# Patient Record
Sex: FEMALE | Race: WHITE | URBAN - NONMETROPOLITAN AREA
[De-identification: names, ages, dates, MRNs, and addresses within clinical notes are randomized per-mention and may not be internally consistent; named-entity substitution may affect disease eponyms.]

---

## 2019-10-14 ENCOUNTER — OFFICE VISIT (OUTPATIENT)
Dept: URGENT CARE | Facility: PHYSICIAN GROUP | Age: 19
End: 2019-10-14
Payer: COMMERCIAL

## 2019-10-14 VITALS
HEART RATE: 80 BPM | OXYGEN SATURATION: 98 % | SYSTOLIC BLOOD PRESSURE: 114 MMHG | DIASTOLIC BLOOD PRESSURE: 72 MMHG | WEIGHT: 146 LBS | BODY MASS INDEX: 25.87 KG/M2 | RESPIRATION RATE: 14 BRPM | HEIGHT: 63 IN | TEMPERATURE: 97.5 F

## 2019-10-14 DIAGNOSIS — H66.002 NON-RECURRENT ACUTE SUPPURATIVE OTITIS MEDIA OF LEFT EAR WITHOUT SPONTANEOUS RUPTURE OF TYMPANIC MEMBRANE: ICD-10-CM

## 2019-10-14 PROCEDURE — 99203 OFFICE O/P NEW LOW 30 MIN: CPT | Performed by: FAMILY MEDICINE

## 2019-10-14 RX ORDER — CEFDINIR 300 MG/1
300 CAPSULE ORAL 2 TIMES DAILY
Qty: 14 CAP | Refills: 0 | Status: SHIPPED | OUTPATIENT
Start: 2019-10-14 | End: 2019-10-21

## 2019-10-14 ASSESSMENT — ENCOUNTER SYMPTOMS
FEVER: 0
SHORTNESS OF BREATH: 0
VOMITING: 0

## 2019-10-15 NOTE — PROGRESS NOTES
"Subjective:     Gianna Cruz is a 18 y.o. female who presents for Otalgia    HPI  Pt presents for evaluation of a new problem   Pt with left ear pain for the past few weeks   Initially was only intermittent  Pain is now constant and worsening the last week  Nothing makes pain better    Review of Systems   Constitutional: Negative for fever.   HENT: Positive for ear pain.    Respiratory: Negative for shortness of breath.    Cardiovascular: Negative for chest pain.   Gastrointestinal: Negative for vomiting.   Skin: Negative for rash.       PMH: No recurrent ear problems  MEDS:   Current Outpatient Medications:   •  fluoxetine (PROZAC) 20 MG Cap, Take 1 Cap by mouth every day., Disp: 30 Cap, Rfl: 2  ALLERGIES:   Allergies   Allergen Reactions   • Pcn [Penicillins]      SURGHX: No past surgical history on file.  SOCHX:  reports that she has never smoked. She has never used smokeless tobacco. She reports that she does not drink alcohol or use drugs.  FH: Family history was reviewed, not contributing to acute illness     Objective:   /72 (BP Location: Left arm, Patient Position: Sitting, BP Cuff Size: Adult)   Pulse 80   Temp 36.4 °C (97.5 °F) (Temporal)   Resp 14   Ht 1.6 m (5' 3\")   Wt 66.2 kg (146 lb)   SpO2 98%   BMI 25.86 kg/m²     Physical Exam   Constitutional: She is oriented to person, place, and time. She appears well-developed and well-nourished. No distress.   HENT:   Head: Normocephalic and atraumatic.   Left ear erythematous with small purulent effusion present, no perforation appreciated   Neurological: She is alert and oriented to person, place, and time. No sensory deficit.   Skin: Skin is warm and dry. She is not diaphoretic. No erythema.   Psychiatric: She has a normal mood and affect. Her behavior is normal. Judgment and thought content normal.       Assessment/Plan:   Assessment    1. Non-recurrent acute suppurative otitis media of left ear without spontaneous rupture of tympanic " membrane  - cefdinir (OMNICEF) 300 MG Cap; Take 1 Cap by mouth 2 times a day for 7 days.  Dispense: 14 Cap; Refill: 0

## 2020-01-09 ENCOUNTER — GYNECOLOGY VISIT (OUTPATIENT)
Dept: OBGYN | Facility: CLINIC | Age: 20
End: 2020-01-09
Payer: COMMERCIAL

## 2020-01-09 VITALS — SYSTOLIC BLOOD PRESSURE: 127 MMHG | WEIGHT: 147 LBS | BODY MASS INDEX: 26.04 KG/M2 | DIASTOLIC BLOOD PRESSURE: 80 MMHG

## 2020-01-09 DIAGNOSIS — N93.8 DUB (DYSFUNCTIONAL UTERINE BLEEDING): ICD-10-CM

## 2020-01-09 LAB
INT CON NEG: NEGATIVE
INT CON POS: POSITIVE
POC URINE PREGNANCY TEST: POSITIVE

## 2020-01-09 PROCEDURE — 99203 OFFICE O/P NEW LOW 30 MIN: CPT | Mod: 25 | Performed by: OBSTETRICS & GYNECOLOGY

## 2020-01-09 PROCEDURE — 76830 TRANSVAGINAL US NON-OB: CPT | Performed by: OBSTETRICS & GYNECOLOGY

## 2020-01-09 PROCEDURE — 81025 URINE PREGNANCY TEST: CPT | Performed by: OBSTETRICS & GYNECOLOGY

## 2020-01-09 NOTE — NON-PROVIDER
Pt here for her DUB  States morning sickness, breast pain, diarrhea and cramps   LMP: Not sure  Ph#[phone]  Pharmacy confirmed w/ pt

## 2020-01-09 NOTE — PROGRESS NOTES
Chief complaint;  This patient is a 19 y.o. female , No LMP recorded. Patient is pregnant. presents complaining of dysfunctional uterine bleeding.    Review of systems-denies; chest pain, shortness of breath, fever, chills, abdominal pain  Past OB history-  OB History    Para Term  AB Living   1             SAB TAB Ectopic Molar Multiple Live Births                    # Outcome Date GA Lbr Geovanny/2nd Weight Sex Delivery Anes PTL Lv   1 Current              Past surgical history- History reviewed. No pertinent surgical history.  Past medical history-   Past Medical History:   Diagnosis Date   • Depression      Allergies- Pcn [penicillins]  Present medications-   Outpatient Encounter Medications as of 2020   Medication Sig Dispense Refill   • fluoxetine (PROZAC) 20 MG Cap Take 1 Cap by mouth every day. 30 Cap 2     No facility-administered encounter medications on file as of 2020.      Family history- no history of breast or ovarian cancer  Social history-   Social History     Socioeconomic History   • Marital status: Single     Spouse name: Not on file   • Number of children: Not on file   • Years of education: Not on file   • Highest education level: Not on file   Occupational History   • Not on file   Social Needs   • Financial resource strain: Not on file   • Food insecurity:     Worry: Not on file     Inability: Not on file   • Transportation needs:     Medical: Not on file     Non-medical: Not on file   Tobacco Use   • Smoking status: Never Smoker   • Smokeless tobacco: Never Used   Substance and Sexual Activity   • Alcohol use: No   • Drug use: No   • Sexual activity: Yes     Partners: Male   Lifestyle   • Physical activity:     Days per week: Not on file     Minutes per session: Not on file   • Stress: Not on file   Relationships   • Social connections:     Talks on phone: Not on file     Gets together: Not on file     Attends Sikhism service: Not on file     Active member of club or  organization: Not on file     Attends meetings of clubs or organizations: Not on file     Relationship status: Not on file   • Intimate partner violence:     Fear of current or ex partner: Not on file     Emotionally abused: Not on file     Physically abused: Not on file     Forced sexual activity: Not on file   Other Topics Concern   • Behavioral problems Not Asked   • Interpersonal relationships Not Asked   • Sad or not enjoying activities Not Asked   • Suicidal thoughts Not Asked   • Poor school performance Not Asked   • Reading difficulties Not Asked   • Speech difficulties Not Asked   • Writing difficulties Not Asked   • Inadequate sleep Not Asked   • Excessive TV viewing Not Asked   • Excessive video game use Not Asked   • Inadequate exercise Not Asked   • Sports related Not Asked   • Poor diet Not Asked   • Family concerns for drug/alcohol abuse Not Asked   • Poor oral hygiene Not Asked   • Bike safety Not Asked   • Family concerns vehicle safety Not Asked   Social History Narrative   • Not on file         Physical examination;   Alert and oriented x3  General-well-developed well-nourished female in no apparent distress  Vitals:    01/09/20 1358   BP: 127/80   Weight: 66.7 kg (147 lb)     Skin is warm and dry   HEENT-normocephalic,nontraumatic,PERRLA,EOMI  Throat- no edema or erythema  Neck-supple  Cardiovascular-regular rate and rhythm, normal S1-S2 without murmurs or gallops  Lungs-clear to auscultation bilaterally  Back-negative CVA tenderness  Abdomen-nondistended positive bowel sounds soft nontender without masses or hepatosplenomegaly  Pelvic examination;  External genitalia-without lesions  Vagina-no blood, no discharge  Cervix-closed,no gross lesions  Uterus-8 weeks size, nontender  Adnexa- without mass or tenderness  Extremities-without cyanosis clubbing or edema  Neurologic-grossly intact    Transvaginal ultrasound; as performed and read by myself; intrauterine gestational sac containing vinson  pregnancy crown-rump length consistent with 7 weeks 5 days, EDC 8/22/2020+ cardiac and fetal motion yolk sac present no free pelvic fluid    Impression;  Dysfunctional uterine bleeding-no evidence of ectopic or miscarriage    Plan;   SS-not discussed  Needs initial OB      35 Minutes total spent with the patient in face-to-face contact,5 minutes of total time utilized to perform  Ultrasound, greater than 50% of the time has been spent on counseling and coordination of care. Many questions answered regarding possible miscarriage.

## 2020-04-04 ENCOUNTER — HOSPITAL ENCOUNTER (OUTPATIENT)
Facility: MEDICAL CENTER | Age: 20
End: 2020-04-04
Attending: PHYSICIAN ASSISTANT
Payer: COMMERCIAL

## 2020-04-04 ENCOUNTER — OFFICE VISIT (OUTPATIENT)
Dept: URGENT CARE | Facility: PHYSICIAN GROUP | Age: 20
End: 2020-04-04
Payer: COMMERCIAL

## 2020-04-04 VITALS
OXYGEN SATURATION: 97 % | WEIGHT: 147 LBS | BODY MASS INDEX: 26.05 KG/M2 | TEMPERATURE: 98.3 F | SYSTOLIC BLOOD PRESSURE: 110 MMHG | DIASTOLIC BLOOD PRESSURE: 70 MMHG | HEIGHT: 63 IN | RESPIRATION RATE: 16 BRPM | HEART RATE: 79 BPM

## 2020-04-04 DIAGNOSIS — R42 DIZZY: ICD-10-CM

## 2020-04-04 DIAGNOSIS — R10.30 LOWER ABDOMINAL PAIN: ICD-10-CM

## 2020-04-04 DIAGNOSIS — N30.01 ACUTE CYSTITIS WITH HEMATURIA: ICD-10-CM

## 2020-04-04 DIAGNOSIS — J02.9 SORE THROAT: ICD-10-CM

## 2020-04-04 DIAGNOSIS — M79.10 MYALGIA: ICD-10-CM

## 2020-04-04 DIAGNOSIS — R09.81 NASAL CONGESTION: ICD-10-CM

## 2020-04-04 LAB
APPEARANCE UR: CLEAR
BILIRUB UR STRIP-MCNC: NEGATIVE MG/DL
COLOR UR AUTO: YELLOW
FLUAV+FLUBV AG SPEC QL IA: NEGATIVE
GLUCOSE UR STRIP.AUTO-MCNC: NEGATIVE MG/DL
INT CON NEG: NORMAL
INT CON POS: NORMAL
KETONES UR STRIP.AUTO-MCNC: NEGATIVE MG/DL
LEUKOCYTE ESTERASE UR QL STRIP.AUTO: NORMAL
NITRITE UR QL STRIP.AUTO: POSITIVE
PH UR STRIP.AUTO: 7 [PH] (ref 5–8)
POC URINE PREGNANCY TEST: NEGATIVE
PROT UR QL STRIP: NEGATIVE MG/DL
RBC UR QL AUTO: NORMAL
S PYO AG THROAT QL: NEGATIVE
SP GR UR STRIP.AUTO: 1.02
UROBILINOGEN UR STRIP-MCNC: NEGATIVE MG/DL

## 2020-04-04 PROCEDURE — 99214 OFFICE O/P EST MOD 30 MIN: CPT | Performed by: PHYSICIAN ASSISTANT

## 2020-04-04 PROCEDURE — 87086 URINE CULTURE/COLONY COUNT: CPT

## 2020-04-04 PROCEDURE — 87804 INFLUENZA ASSAY W/OPTIC: CPT | Performed by: PHYSICIAN ASSISTANT

## 2020-04-04 PROCEDURE — 81025 URINE PREGNANCY TEST: CPT | Performed by: PHYSICIAN ASSISTANT

## 2020-04-04 PROCEDURE — 87077 CULTURE AEROBIC IDENTIFY: CPT

## 2020-04-04 PROCEDURE — 87880 STREP A ASSAY W/OPTIC: CPT | Performed by: PHYSICIAN ASSISTANT

## 2020-04-04 PROCEDURE — 81002 URINALYSIS NONAUTO W/O SCOPE: CPT | Performed by: PHYSICIAN ASSISTANT

## 2020-04-04 PROCEDURE — 87186 SC STD MICRODIL/AGAR DIL: CPT

## 2020-04-04 RX ORDER — DROSPIRENONE AND ETHINYL ESTRADIOL 0.02-3(28)
1 KIT ORAL
COMMUNITY
Start: 2020-01-28 | End: 2020-08-29

## 2020-04-04 RX ORDER — NITROFURANTOIN 25; 75 MG/1; MG/1
100 CAPSULE ORAL EVERY 12 HOURS
Qty: 10 CAP | Refills: 0 | Status: SHIPPED | OUTPATIENT
Start: 2020-04-04 | End: 2020-04-09

## 2020-04-04 NOTE — PROGRESS NOTES
Chief Complaint   Patient presents with   • Dizziness       HISTORY OF PRESENT ILLNESS: Patient is a 19 y.o. female who presents today because she has a 4-day history of sore throat, nasal congestion, some episodes of dizziness, some lower abdominal discomfort and myalgias.  She has been taking DayQuil, NyQuil, Tylenol and ibuprofen for symptoms without improvement.    Patient Active Problem List    Diagnosis Date Noted   • Depression 11/24/2015   • Family history of hypothyroidism 11/24/2015       Allergies:Pcn [penicillins]    Current Outpatient Medications Ordered in Epic   Medication Sig Dispense Refill   • nitrofurantoin (MACROBID) 100 MG Cap Take 1 Cap by mouth every 12 hours for 5 days. 10 Cap 0   • LO-ZUMANDIMINE 3-0.02 MG per tablet Take 1 Tab by mouth.     • fluoxetine (PROZAC) 20 MG Cap Take 1 Cap by mouth every day. 30 Cap 2     No current Epic-ordered facility-administered medications on file.        Past Medical History:   Diagnosis Date   • Depression        Social History     Tobacco Use   • Smoking status: Never Smoker   • Smokeless tobacco: Never Used   Substance Use Topics   • Alcohol use: No   • Drug use: No       Family Status   Relation Name Status   • Mo  Alive   • Fa  Alive     Family History   Problem Relation Age of Onset   • No Known Problems Mother    • No Known Problems Father        ROS:  Review of Systems   Constitutional: Positive for fever, chills, myalgias and malaise/fatigue.   HENT: Negative for ear pain, nosebleeds, positive for nasal congestion, sore throat and no neck pain.    Eyes: Negative for blurred vision.   Respiratory: Positive for mild cough, no sputum production, shortness of breath and wheezing.    Cardiovascular: Negative for chest pain, palpitations, orthopnea and leg swelling.   Gastrointestinal: Negative for heartburn, nausea, vomiting and positive for mild lower abdominal pain.   Genitourinary: Negative for dysuria, urgency and frequency.     Exam:  /70    "Pulse 79   Temp 36.8 °C (98.3 °F) (Temporal)   Resp 16   Ht 1.6 m (5' 3\")   Wt 66.7 kg (147 lb)   SpO2 97%   General:  Well nourished, well developed female in NAD  Head:Normocephalic, atraumatic  Eyes: PERRLA, EOM within normal limits, no conjunctival injection, no scleral icterus, visual fields and acuity grossly intact.  Ears: Normal shape and symmetry, no tenderness, no discharge. External canals are without any significant edema or erythema. Tympanic membranes are without any inflammation, no effusion. Gross auditory acuity is intact  Nose: Symmetrical without tenderness, no discharge.  Nasal mucosa is edematous bilaterally  Mouth: reasonable hygiene, mild pharyngeal erythema without exudates or tonsillar enlargement.  Neck: no masses, range of motion within normal limits, no tracheal deviation. No obvious thyroid enlargement.  Pulmonary: chest is symmetrical with respiration, no wheezes, crackles, or rhonchi.  Cardiovascular: regular rate and rhythm without murmurs, rubs, or gallops.  Extremities: no clubbing, cyanosis, or edema.    Urinalysis has nitrates, moderate blood and trace leuks    Strep test negative    Influenza AB test negative    hcg negative    Please note that this dictation was created using voice recognition software. I have made every reasonable attempt to correct obvious errors, but I expect that there are errors of grammar and possibly content that I did not discover before finalizing the note.    Assessment/Plan:  1. Dizzy     2. Lower abdominal pain  POCT Urinalysis    POCT Pregnancy   3. Myalgia  POCT Influenza A/B   4. Nasal congestion     5. Sore throat  POCT Rapid Strep A   6. Acute cystitis with hematuria  Urine Culture    nitrofurantoin (MACROBID) 100 MG Cap   In addition to UTI, likely upper respiratory viral symptoms, recommended self-isolation, may return work when self-isolation criteria are met    Followup with primary care in the next 7-10 days if not significantly " improving, return to the urgent care or go to the emergency room sooner for any worsening of symptoms.

## 2020-04-04 NOTE — LETTER
April 4, 2020         Patient: Gianna Cruz   YOB: 2000   Date of Visit: 4/4/2020           To Whom it May Concern:    Gianna Cruz was seen in my clinic on 4/4/2020. She may return to work when guideline criteria of self-isolation are met in accordance with CDC guidelines    If you have any questions or concerns, please don't hesitate to call.        Sincerely,           Ryley Wagner P.A.-C.  Electronically Signed

## 2020-04-06 LAB
BACTERIA UR CULT: ABNORMAL
BACTERIA UR CULT: ABNORMAL
SIGNIFICANT IND 70042: ABNORMAL
SITE SITE: ABNORMAL
SOURCE SOURCE: ABNORMAL

## 2020-08-27 ENCOUNTER — OFFICE VISIT (OUTPATIENT)
Dept: URGENT CARE | Facility: PHYSICIAN GROUP | Age: 20
End: 2020-08-27
Payer: COMMERCIAL

## 2020-08-27 VITALS
HEIGHT: 63 IN | TEMPERATURE: 97.3 F | WEIGHT: 140 LBS | OXYGEN SATURATION: 98 % | DIASTOLIC BLOOD PRESSURE: 74 MMHG | BODY MASS INDEX: 24.8 KG/M2 | HEART RATE: 82 BPM | SYSTOLIC BLOOD PRESSURE: 116 MMHG

## 2020-08-27 DIAGNOSIS — K29.70 GASTRITIS WITHOUT BLEEDING, UNSPECIFIED CHRONICITY, UNSPECIFIED GASTRITIS TYPE: ICD-10-CM

## 2020-08-27 DIAGNOSIS — R42 DIZZY: ICD-10-CM

## 2020-08-27 DIAGNOSIS — R11.0 NAUSEA: ICD-10-CM

## 2020-08-27 LAB
APPEARANCE UR: CLEAR
BILIRUB UR STRIP-MCNC: NORMAL MG/DL
COLOR UR AUTO: YELLOW
GLUCOSE UR STRIP.AUTO-MCNC: NORMAL MG/DL
INT CON NEG: NEGATIVE
INT CON POS: POSITIVE
KETONES UR STRIP.AUTO-MCNC: NORMAL MG/DL
LEUKOCYTE ESTERASE UR QL STRIP.AUTO: NORMAL
NITRITE UR QL STRIP.AUTO: NORMAL
PH UR STRIP.AUTO: 7 [PH] (ref 5–8)
POC URINE PREGNANCY TEST: NORMAL
PROT UR QL STRIP: NORMAL MG/DL
RBC UR QL AUTO: NORMAL
SP GR UR STRIP.AUTO: 1.01
UROBILINOGEN UR STRIP-MCNC: NORMAL MG/DL

## 2020-08-27 PROCEDURE — 99214 OFFICE O/P EST MOD 30 MIN: CPT | Performed by: PHYSICIAN ASSISTANT

## 2020-08-27 PROCEDURE — 81002 URINALYSIS NONAUTO W/O SCOPE: CPT | Performed by: PHYSICIAN ASSISTANT

## 2020-08-27 PROCEDURE — 81025 URINE PREGNANCY TEST: CPT | Performed by: PHYSICIAN ASSISTANT

## 2020-08-27 RX ORDER — ELETRIPTAN HYDROBROMIDE 20 MG/1
TABLET, FILM COATED ORAL
COMMUNITY
Start: 2020-07-14 | End: 2020-08-29

## 2020-08-27 RX ORDER — MEDROXYPROGESTERONE ACETATE 150 MG/ML
150 INJECTION, SUSPENSION INTRAMUSCULAR ONCE
COMMUNITY
End: 2021-10-25

## 2020-08-27 RX ORDER — MECLIZINE HYDROCHLORIDE 25 MG/1
25 TABLET ORAL 3 TIMES DAILY PRN
Qty: 30 TAB | Refills: 0 | Status: SHIPPED | OUTPATIENT
Start: 2020-08-27 | End: 2020-08-29

## 2020-08-27 RX ORDER — DOXYCYCLINE HYCLATE 20 MG
20 TABLET ORAL
COMMUNITY
Start: 2020-06-15 | End: 2020-09-14

## 2020-08-27 RX ORDER — OMEPRAZOLE 20 MG/1
20 CAPSULE, DELAYED RELEASE ORAL DAILY
Qty: 30 CAP | Refills: 0 | Status: SHIPPED | OUTPATIENT
Start: 2020-08-27 | End: 2020-08-29

## 2020-08-27 NOTE — PROGRESS NOTES
Chief Complaint   Patient presents with   • Dizziness     x 2 days abd pain x1 month nausea       HISTORY OF PRESENT ILLNESS: Patient is a 19 y.o. female who presents today because she has 2 different issues to discuss.    1.  She has a 2-day history of dizziness.  It is worse with positional changes but continues throughout the day, sometimes causing her to sit down.  Denies any fevers, chills, ear pain.    2.  She has a 1 month history of midepigastric and sometimes lower abdominal discomfort.  No changes in bowel or bladder patterns, no vomiting.  She has not been taking any medications for either of these symptoms    Patient Active Problem List    Diagnosis Date Noted   • Depression 11/24/2015   • Family history of hypothyroidism 11/24/2015       Allergies:Pcn [penicillins]    Current Outpatient Medications Ordered in Epic   Medication Sig Dispense Refill   • doxycycline (PERIOSTAT) 20 MG tablet Take 20 mg by mouth.     • medroxyPROGESTERone (DEPO-PROVERA) 150 MG/ML Suspension 150 mg by Intramuscular route Once.     • omeprazole (PRILOSEC) 20 MG delayed-release capsule Take 1 Cap by mouth every day. 30 Cap 0   • meclizine (ANTIVERT) 25 MG Tab Take 1 Tab by mouth 3 times a day as needed. 30 Tab 0   • eletriptan (RELPAX) 20 MG Tab TAKE ONE TABLET BY MOUTH ONCE AS NEEDED FOR MIGRAINE HEADACHE MAY REPEAT DOSE ONCE IN 2 HOURS     • sertraline (ZOLOFT) 50 MG Tab TAKE 1 TABLET BY MOUTH ONCE DAILY FOR 90 DAYS     • LO-ZUMANDIMINE 3-0.02 MG per tablet Take 1 Tab by mouth.     • fluoxetine (PROZAC) 20 MG Cap Take 1 Cap by mouth every day. (Patient not taking: Reported on 8/27/2020) 30 Cap 2     No current Epic-ordered facility-administered medications on file.        Past Medical History:   Diagnosis Date   • Depression        Social History     Tobacco Use   • Smoking status: Never Smoker   • Smokeless tobacco: Never Used   Substance Use Topics   • Alcohol use: No   • Drug use: No       Family Status   Relation Name  "Status   • Mo  Alive   • Fa  Alive     Family History   Problem Relation Age of Onset   • No Known Problems Mother    • No Known Problems Father        ROS:  Review of Systems   Constitutional: Negative for fever, chills, weight loss and malaise/fatigue.   HENT: Negative for ear pain, nosebleeds, congestion, sore throat and neck pain.    Eyes: Negative for blurred vision.   Respiratory: Negative for cough, sputum production, shortness of breath and wheezing.    Cardiovascular: Negative for chest pain, palpitations, orthopnea and leg swelling.   Gastrointestinal: Negative for heartburn, nausea, vomiting and positive for abdominal pain.   Genitourinary: Negative for dysuria, urgency and frequency.     Exam:  /74 (BP Location: Right arm, Patient Position: Sitting, BP Cuff Size: Adult)   Pulse 82   Temp 36.3 °C (97.3 °F) (Temporal)   Ht 1.6 m (5' 3\")   Wt 63.5 kg (140 lb)   SpO2 98%   General:  Well nourished, well developed female in NAD  Head:Normocephalic, atraumatic  Eyes: PERRLA, EOM within normal limits, no conjunctival injection, no scleral icterus, visual fields and acuity grossly intact.  Ears: Normal shape and symmetry, no tenderness, no discharge. External canals are without any significant edema or erythema. Tympanic membranes are without any inflammation, no effusion. Gross auditory acuity is intact  Nose: Symmetrical without tenderness, no discharge.  Mouth: reasonable hygiene, no erythema exudates or tonsillar enlargement.  Neck: no masses, range of motion within normal limits, no tracheal deviation. No obvious thyroid enlargement.  Pulmonary: chest is symmetrical with respiration, no wheezes, crackles, or rhonchi.  Cardiovascular: regular rate and rhythm without murmurs, rubs, or gallops.  Abdomen: Nondistended, bowel tones in all 4 quadrants, soft, she does have midepigastric tenderness to palpation as well as some mild left and right lower quadrant tenderness to palpation.  No organomegaly, " no rebound or referred rebound tenderness.  Extremities: no clubbing, cyanosis, or edema.    hCG negative, UA has moderate blood, otherwise unremarkable.    Please note that this dictation was created using voice recognition software. I have made every reasonable attempt to correct obvious errors, but I expect that there are errors of grammar and possibly content that I did not discover before finalizing the note.    Assessment/Plan:  1. Dizzy  meclizine (ANTIVERT) 25 MG Tab   2. Nausea  POCT Pregnancy    POCT Urinalysis    omeprazole (PRILOSEC) 20 MG delayed-release capsule   3. Gastritis without bleeding, unspecified chronicity, unspecified gastritis type         Followup with primary care in the next 7-10 days if not significantly improving, return to the urgent care or go to the emergency room sooner for any worsening of symptoms.

## 2020-08-27 NOTE — LETTER
August 27, 2020         Patient: Gianna Cruz   YOB: 2000   Date of Visit: 8/27/2020           To Whom it May Concern:    Gianna Cruz was seen in my clinic on 8/27/2020. She may return to work on 8/29/2020, please excuse any recent absences.    If you have any questions or concerns, please don't hesitate to call.        Sincerely,           Ryley Wagner P.A.-C.  Electronically Signed

## 2020-08-29 ENCOUNTER — APPOINTMENT (OUTPATIENT)
Dept: RADIOLOGY | Facility: MEDICAL CENTER | Age: 20
End: 2020-08-29
Attending: EMERGENCY MEDICINE
Payer: COMMERCIAL

## 2020-08-29 ENCOUNTER — HOSPITAL ENCOUNTER (EMERGENCY)
Facility: MEDICAL CENTER | Age: 20
End: 2020-08-29
Attending: EMERGENCY MEDICINE
Payer: COMMERCIAL

## 2020-08-29 VITALS
HEIGHT: 63 IN | BODY MASS INDEX: 25.66 KG/M2 | RESPIRATION RATE: 15 BRPM | HEART RATE: 74 BPM | OXYGEN SATURATION: 98 % | SYSTOLIC BLOOD PRESSURE: 110 MMHG | WEIGHT: 144.84 LBS | TEMPERATURE: 99 F | DIASTOLIC BLOOD PRESSURE: 77 MMHG

## 2020-08-29 DIAGNOSIS — R42 DIZZINESS: ICD-10-CM

## 2020-08-29 DIAGNOSIS — R10.84 GENERALIZED ABDOMINAL PAIN: ICD-10-CM

## 2020-08-29 LAB
ALBUMIN SERPL BCP-MCNC: 4.4 G/DL (ref 3.2–4.9)
ALBUMIN/GLOB SERPL: 1.6 G/DL
ALP SERPL-CCNC: 63 U/L (ref 30–99)
ALT SERPL-CCNC: 15 U/L (ref 2–50)
ANION GAP SERPL CALC-SCNC: 14 MMOL/L (ref 7–16)
APPEARANCE UR: ABNORMAL
AST SERPL-CCNC: 16 U/L (ref 12–45)
BACTERIA #/AREA URNS HPF: ABNORMAL /HPF
BASOPHILS # BLD AUTO: 1 % (ref 0–1.8)
BASOPHILS # BLD: 0.11 K/UL (ref 0–0.12)
BILIRUB SERPL-MCNC: 0.2 MG/DL (ref 0.1–1.5)
BILIRUB UR QL STRIP.AUTO: NEGATIVE
BUN SERPL-MCNC: 16 MG/DL (ref 8–22)
CALCIUM SERPL-MCNC: 9.2 MG/DL (ref 8.5–10.5)
CHLORIDE SERPL-SCNC: 103 MMOL/L (ref 96–112)
CO2 SERPL-SCNC: 19 MMOL/L (ref 20–33)
COLOR UR: YELLOW
CREAT SERPL-MCNC: 0.7 MG/DL (ref 0.5–1.4)
EOSINOPHIL # BLD AUTO: 0.09 K/UL (ref 0–0.51)
EOSINOPHIL NFR BLD: 0.8 % (ref 0–6.9)
EPI CELLS #/AREA URNS HPF: ABNORMAL /HPF
ERYTHROCYTE [DISTWIDTH] IN BLOOD BY AUTOMATED COUNT: 36.7 FL (ref 35.9–50)
GLOBULIN SER CALC-MCNC: 2.7 G/DL (ref 1.9–3.5)
GLUCOSE SERPL-MCNC: 82 MG/DL (ref 65–99)
GLUCOSE UR STRIP.AUTO-MCNC: NEGATIVE MG/DL
HCG UR QL: NEGATIVE
HCT VFR BLD AUTO: 39 % (ref 37–47)
HGB BLD-MCNC: 13.5 G/DL (ref 12–16)
HYALINE CASTS #/AREA URNS LPF: ABNORMAL /LPF
IMM GRANULOCYTES # BLD AUTO: 0.04 K/UL (ref 0–0.11)
IMM GRANULOCYTES NFR BLD AUTO: 0.4 % (ref 0–0.9)
KETONES UR STRIP.AUTO-MCNC: NEGATIVE MG/DL
LEUKOCYTE ESTERASE UR QL STRIP.AUTO: NEGATIVE
LIPASE SERPL-CCNC: 23 U/L (ref 11–82)
LYMPHOCYTES # BLD AUTO: 2.75 K/UL (ref 1–4.8)
LYMPHOCYTES NFR BLD: 24.5 % (ref 22–41)
MCH RBC QN AUTO: 31 PG (ref 27–33)
MCHC RBC AUTO-ENTMCNC: 34.6 G/DL (ref 33.6–35)
MCV RBC AUTO: 89.7 FL (ref 81.4–97.8)
MICRO URNS: ABNORMAL
MONOCYTES # BLD AUTO: 0.97 K/UL (ref 0–0.85)
MONOCYTES NFR BLD AUTO: 8.7 % (ref 0–13.4)
NEUTROPHILS # BLD AUTO: 7.25 K/UL (ref 2–7.15)
NEUTROPHILS NFR BLD: 64.6 % (ref 44–72)
NITRITE UR QL STRIP.AUTO: NEGATIVE
NRBC # BLD AUTO: 0 K/UL
NRBC BLD-RTO: 0 /100 WBC
PH UR STRIP.AUTO: 8 [PH] (ref 5–8)
PLATELET # BLD AUTO: 241 K/UL (ref 164–446)
PMV BLD AUTO: 8.9 FL (ref 9–12.9)
POTASSIUM SERPL-SCNC: 4.4 MMOL/L (ref 3.6–5.5)
PROT SERPL-MCNC: 7.1 G/DL (ref 6–8.2)
PROT UR QL STRIP: 100 MG/DL
RBC # BLD AUTO: 4.35 M/UL (ref 4.2–5.4)
RBC # URNS HPF: ABNORMAL /HPF
RBC UR QL AUTO: ABNORMAL
SODIUM SERPL-SCNC: 136 MMOL/L (ref 135–145)
SP GR UR STRIP.AUTO: 1.03
UROBILINOGEN UR STRIP.AUTO-MCNC: 0.2 MG/DL
WBC # BLD AUTO: 11.2 K/UL (ref 4.8–10.8)
WBC #/AREA URNS HPF: ABNORMAL /HPF

## 2020-08-29 PROCEDURE — 74176 CT ABD & PELVIS W/O CONTRAST: CPT

## 2020-08-29 PROCEDURE — 700102 HCHG RX REV CODE 250 W/ 637 OVERRIDE(OP): Performed by: EMERGENCY MEDICINE

## 2020-08-29 PROCEDURE — 81001 URINALYSIS AUTO W/SCOPE: CPT

## 2020-08-29 PROCEDURE — 83690 ASSAY OF LIPASE: CPT

## 2020-08-29 PROCEDURE — A9270 NON-COVERED ITEM OR SERVICE: HCPCS | Performed by: EMERGENCY MEDICINE

## 2020-08-29 PROCEDURE — 80053 COMPREHEN METABOLIC PANEL: CPT

## 2020-08-29 PROCEDURE — 700111 HCHG RX REV CODE 636 W/ 250 OVERRIDE (IP): Performed by: EMERGENCY MEDICINE

## 2020-08-29 PROCEDURE — 96374 THER/PROPH/DIAG INJ IV PUSH: CPT

## 2020-08-29 PROCEDURE — 81025 URINE PREGNANCY TEST: CPT

## 2020-08-29 PROCEDURE — 99285 EMERGENCY DEPT VISIT HI MDM: CPT

## 2020-08-29 PROCEDURE — 85025 COMPLETE CBC W/AUTO DIFF WBC: CPT

## 2020-08-29 RX ORDER — KETOROLAC TROMETHAMINE 30 MG/ML
30 INJECTION, SOLUTION INTRAMUSCULAR; INTRAVENOUS ONCE
Status: COMPLETED | OUTPATIENT
Start: 2020-08-29 | End: 2020-08-29

## 2020-08-29 RX ADMIN — KETOROLAC TROMETHAMINE 30 MG: 30 INJECTION, SOLUTION INTRAMUSCULAR at 19:34

## 2020-08-29 RX ADMIN — LIDOCAINE HYDROCHLORIDE 30 ML: 20 SOLUTION OROPHARYNGEAL at 18:09

## 2020-08-30 NOTE — ED PROVIDER NOTES
ED Provider Note    ED Provider Note    Primary care provider: Pcp Pt States None  Means of arrival: Walk-in  History obtained from: Patient    CHIEF COMPLAINT  Chief Complaint   Patient presents with   • Abdominal Pain   • Dizziness     Seen at 5:41 PM.   HPI  Gianna Cruz is a 19 y.o. female who presents to the Emergency Department for abdominal pain and dizziness for the past month.  The abdominal pain is diffuse squeezing pain, moderate constant, nonradiating without any obvious modifying factors.  She has not had a menstrual period for the last few months as she states that is quite irregular.  She notes no association with eating or moving.  She has never had any abdominal surgeries.    The dizziness is described as both lightheadedness and spinning.  It is worse with standing but does not have any change with head movement.  She denies specific nausea but states her body feels unwell.  She has not had any vomiting.  She denies any NSAID use.  She has headaches occasionally that are mild, none currently.  She denies any cough, fevers, chills, chest pain, shortness of breath, diarrhea, melena, hematochezia, dysuria, constipation.  She notes her urine has been cloudy for the past few days.  She does have a history of depression.  She was on sertraline for the past year, she discontinued it 1 week ago to see if it would help with her symptoms.  It did not.  She went to the urgent care 48 hours ago and then [place] emergency department today.  She states that they did not do anything for her and she was discharged.    REVIEW OF SYSTEMS  See HPI,   Remainder of ROS negative.     PAST MEDICAL HISTORY   has a past medical history of Depression.    SURGICAL HISTORY  patient denies any surgical history    SOCIAL HISTORY  Social History     Tobacco Use   • Smoking status: Never Smoker   • Smokeless tobacco: Never Used   Substance Use Topics   • Alcohol use: Yes   • Drug use: No      Social History     Substance and  "Sexual Activity   Drug Use No       FAMILY HISTORY  Family History   Problem Relation Age of Onset   • No Known Problems Mother    • No Known Problems Father        CURRENT MEDICATIONS  Reviewed.  See Encounter Summary.     ALLERGIES  Allergies   Allergen Reactions   • Pcn [Penicillins]        PHYSICAL EXAM  VITAL SIGNS: /77   Pulse 74   Temp 37.2 °C (99 °F) (Temporal)   Resp 15   Ht 1.6 m (5' 3\")   Wt 65.7 kg (144 lb 13.5 oz)   LMP 08/09/2020   SpO2 98%   Breastfeeding Unknown   BMI 25.66 kg/m²   Constitutional: Awake, alert in no apparent distress.  HENT: Normocephalic, Bilateral external ears normal. Nose normal.  Moist mucosal membranes.  Eyes: Conjunctiva normal, non-icteric, EOMI.    Thorax & Lungs: Easy unlabored respirations, Clear to ascultation bilaterally.  Cardiovascular: Regular rate, Regular rhythm, No murmurs, rubs or gallops. Bilateral pulses symmetrical.   Abdomen:  Soft, mild diffuse abdominal tenderness without rebound or guarding, no peritonitis, negative Richards's, negative Rovsing's, negative psoas, negative obturator.  No CVA tenderness though the patient states it was uncomfortable with palpation along the back.    :    Skin: Visualized skin is  Dry, No erythema, No rash.   Musculoskeletal:   No cyanosis, clubbing or edema. No leg asymmetry.   Neurologic: Alert, Grossly non-focal.   Psychiatric: Normal affect, Normal mood  Lymphatic:  No cervical LAD        RADIOLOGY  CT-RENAL COLIC EVALUATION(A/P W/O)   Final Result         No evidence of urolithiasis or hydronephrosis.      No evidence of acute abdominal or pelvic pathology.      Normal-appearing appendix with apparent small appendicolith in the tip of the appendix.            COURSE & MEDICAL DECISION MAKING  Pertinent Labs & Imaging studies reviewed. (See chart for details)    Differential diagnoses include but are not limited to: Symptomatic anemia, dyspepsia, pregnancy, discontinuation syndrome    5:41 PM - Medical " record reviewed, patient was evaluated the urgent care earlier this week, pregnancy test was negative, she was diagnosed with dizziness and placed on Antivert.    Decision Making:  This is a 19 y.o. year old female who presents with dizziness described as both room spinning and lightheadedness without any significant altering factors.  The patient is hemodynamically stable, she is not tachycardic, she is neurologically intact and not anemic.  She does not have any cardiac issues, she denies any chest pain, shortness of breath or palpitations.  I have no explanation for her dizziness, it seems very inconsistent with any specific organic cause.  It certainly is not concerning for vertebral artery dissection or cerebellar CVA.  I see no indication for emergent imaging of the brain.  She has no vestibular symptoms currently.    She also notes abdominal pain, I cannot connect the 2 complaints.  She has a benign abdominal examination, she has no specific predominance to the pain so was difficult to elicit exactly where her pain is originating from and what the possible etiology is.  She did have significant hematuria today, I asked the patient if she was on her menstrual cycle and she stated no.  For this reason a Noncon CT was done to look for possible nephrolithiasis.  CT is essentially normal.    In summary, this a well-appearing 19-year-old female with normal vital signs, normal neurologic exam normal labs who presents with some type of vague dizziness/vertigo/abdominal pain.  She is not pregnant, no chance for ectopic pregnancy, appendix is normal, kidney function, renal anatomy is normal, gallbladder is normal, she had no improvement with GI cocktail, no improved with Toradol.  I do not have any specific explanation for the patient's symptoms today but it does not appear to be an emergent cause.    She has been on sertraline for years for anxiety and stopped it 1 week ago due to her symptoms as above.   Discontinuation syndrome is a consideration though temporarily is not really consistent with this.  This also could all be a manifestation of her underlying psychiatric disorder.  I recommend she follow with her primary care physician for further work-up.    Discharge Medications:  Discharge Medication List as of 8/29/2020  8:26 PM          The patient was discharged home (see d/c instructions) was told to return immediately for any signs or symptoms listed, or any worsening at all.  The patient verbally agreed to the discharge precautions and follow-up plan which is documented in EPIC.    The patient's blood pressure is elevated today. >120/80. I have referred them to primary care for follow up.       FINAL IMPRESSION  1. Dizziness    2. Generalized abdominal pain

## 2020-08-30 NOTE — ED TRIAGE NOTES
"20 y/o female ambulatory to triage with c/o abd pain x 1 month, pt states she began to feel intermittent dizziness \"a few days ago\" as well.  "

## 2020-08-30 NOTE — ED NOTES
Discharge teaching and paperwork provided regarding abdominal pain and all questions/concerns answered. VSS, abdominal assessment stable and PIV removed. Given information regarding. Patient discharged to the care of her boyfriend and ambulated out of the ED.    Patient provided a work note.

## 2020-09-14 ENCOUNTER — OFFICE VISIT (OUTPATIENT)
Dept: BEHAVIORAL HEALTH | Facility: CLINIC | Age: 20
End: 2020-09-14
Payer: COMMERCIAL

## 2020-09-14 VITALS
SYSTOLIC BLOOD PRESSURE: 112 MMHG | HEIGHT: 63 IN | WEIGHT: 148 LBS | DIASTOLIC BLOOD PRESSURE: 72 MMHG | BODY MASS INDEX: 26.22 KG/M2 | HEART RATE: 70 BPM

## 2020-09-14 DIAGNOSIS — F33.1 MDD (MAJOR DEPRESSIVE DISORDER), RECURRENT EPISODE, MODERATE (HCC): ICD-10-CM

## 2020-09-14 DIAGNOSIS — F41.1 GAD (GENERALIZED ANXIETY DISORDER): ICD-10-CM

## 2020-09-14 PROBLEM — F33.2 MAJOR DEPRESSIVE DISORDER, RECURRENT SEVERE WITHOUT PSYCHOTIC FEATURES (HCC): Status: ACTIVE | Noted: 2020-09-14

## 2020-09-14 PROBLEM — F33.2 MAJOR DEPRESSIVE DISORDER, RECURRENT SEVERE WITHOUT PSYCHOTIC FEATURES (HCC): Status: RESOLVED | Noted: 2020-09-14 | Resolved: 2020-09-14

## 2020-09-14 PROCEDURE — 99204 OFFICE O/P NEW MOD 45 MIN: CPT | Performed by: PSYCHIATRY & NEUROLOGY

## 2020-09-14 PROCEDURE — 90833 PSYTX W PT W E/M 30 MIN: CPT | Performed by: PSYCHIATRY & NEUROLOGY

## 2020-09-14 RX ORDER — ESCITALOPRAM OXALATE 10 MG/1
TABLET ORAL
Qty: 27 TAB | Refills: 0 | Status: SHIPPED | OUTPATIENT
Start: 2020-09-14 | End: 2020-10-12 | Stop reason: SDUPTHER

## 2020-09-14 SDOH — HEALTH STABILITY: MENTAL HEALTH: HOW OFTEN DO YOU HAVE A DRINK CONTAINING ALCOHOL?: 2-4 TIMES A MONTH

## 2020-09-14 ASSESSMENT — FIBROSIS 4 INDEX: FIB4 SCORE: 0.34

## 2020-09-14 ASSESSMENT — PATIENT HEALTH QUESTIONNAIRE - PHQ9
5. POOR APPETITE OR OVEREATING: 1 - SEVERAL DAYS
CLINICAL INTERPRETATION OF PHQ2 SCORE: 6
SUM OF ALL RESPONSES TO PHQ QUESTIONS 1-9: 17
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
## 2020-10-12 ENCOUNTER — OFFICE VISIT (OUTPATIENT)
Dept: BEHAVIORAL HEALTH | Facility: CLINIC | Age: 20
End: 2020-10-12
Payer: COMMERCIAL

## 2020-10-12 VITALS — HEIGHT: 63 IN | WEIGHT: 152 LBS | BODY MASS INDEX: 26.93 KG/M2

## 2020-10-12 DIAGNOSIS — F41.1 GAD (GENERALIZED ANXIETY DISORDER): ICD-10-CM

## 2020-10-12 DIAGNOSIS — F33.1 MDD (MAJOR DEPRESSIVE DISORDER), RECURRENT EPISODE, MODERATE (HCC): ICD-10-CM

## 2020-10-12 PROCEDURE — 99213 OFFICE O/P EST LOW 20 MIN: CPT | Performed by: PSYCHIATRY & NEUROLOGY

## 2020-10-12 RX ORDER — ESCITALOPRAM OXALATE 10 MG/1
10 TABLET ORAL DAILY
Qty: 30 TAB | Refills: 1 | Status: SHIPPED | OUTPATIENT
Start: 2020-10-12 | End: 2020-11-19 | Stop reason: SDUPTHER

## 2020-10-12 ASSESSMENT — FIBROSIS 4 INDEX: FIB4 SCORE: 0.34

## 2020-10-12 NOTE — PROGRESS NOTES
PSYCHIATRY FOLLOW-UP NOTE      Chief Complaint   Patient presents with   • Follow-Up     depression, anxiety         History Of Present Illness:  Gianna Cruz is a 19 y.o. female with major depressive disorder, generalized anxiety disorder comes in today for follow up, was last seen 4 weeks ago.  She has been feeling better in regards to her mental health since he started taking Lexapro.  He is unable to explain what is better but is feeling less depressed and anxious than before.  She is having less mood swings as well.  She so far has not noticed any side effects from it.  She was let go from her job at [employer] but she was able to get another job which she will be starting next week.  She and her boyfriend are doing good in regards to the relationship and she denies any new psychosocial stressors.  She normally does well around the holidays.  She denies any new psychosocial stressors.  She has been doing all right in regards to her sleep but endorses some nightmares lately.  She has noticed some weight gain since he started taking Lexapro.  She denies any other impulsive or reckless behaviors.  She denies having thoughts of wanting to hurt herself or others.    Social History:   She is in a relationship, never , no kids, lives with her boyfriend in [place], recently lost her job at [employer] but will be starting a new job at a local [employer] next week, complicated relationship with her parents who were  and both live in [place].    Substance Use:  Alcohol - Drinks alcohol in social settings  Nicotine - Vapes nicotine daily  Cannabis - Denies  Illicit drugs - Denies     Past Medication Trials:  Prozac 20 mg, Zoloft 50 mg (ineffective)    Medications:  Current Outpatient Medications   Medication Sig Dispense Refill   • escitalopram (LEXAPRO) 10 MG Tab Take 1 Tab by mouth every day. 30 Tab 1   • IBUPROFEN PO Take  by mouth.     • medroxyPROGESTERone (DEPO-PROVERA) 150 MG/ML Suspension 150 mg by  "Intramuscular route Once.       No current facility-administered medications for this visit.        Review Of Systems:    Constitutional - Positive for fatigue  Respiratory - Negative for shortness of breath, cough  CVS - Negative for chest pain, palpitations  GI - Negative for nausea, vomiting, abdominal pain, diarrhea, constipation  Musculoskeletal - Negative for back pain  Neurological - Negative for headaches  Psychiatric - Positive for depression, anxiety, mood swings     Physical Examination:  Vital signs: Ht 1.6 m (5' 3\")   Wt 68.9 kg (152 lb)   BMI 26.93 kg/m²     Musculoskeletal: Normal gait. No abnormal movements.     Mental Status Evaluation:   General: Young white female, dressed in casual attire, good grooming and hygiene, in no apparent distress, calm and cooperative, good eye contact, no psychomotor agitation or retardation  Orientation: Alert and oriented to person, place and time  Recent and remote memory: Grossly intact  Attention span and concentration: Grossly intact  Speech: Spontaneous, normal rate, rhythm and tone  Thought Process: Linear, logical and goal directed  Thought Content: Denies suicidal or homicidal ideations, intent or plan  Perception: Denies auditory or visual hallucinations. No delusions noted  Associations: Intact  Language: Appropriate  Fund of knowledge and vocabulary: Grossly adequate  Mood: \"good\"  Affect: Anxious at times, mood congruent  Insight: Good  Judgment: Good    Depression screening:  Depression Screen (PHQ-2/PHQ-9) 11/24/2015 9/14/2020   PHQ-2 Total Score 3 -   PHQ-2 Total Score - 6   PHQ-9 Total Score 12 -   PHQ-9 Total Score - 17       Interpretation of PHQ-9 Total Score   Score Severity   1-4 No Depression   5-9 Mild Depression   10-14 Moderate Depression   15-19 Moderately Severe Depression   20-27 Severe Depression    Medical Records/Labs/Diagnostic Tests Reviewed:  [place] [facility] records - 1 prescription of Ambien found in the last 2 years, no abuse suspected "       Impression:  1.  Major depressive disorder, recurrent, moderate - improving   2.  Generalized anxiety disorder - improving   3.  Borderline personality disorder traits     Plan:  1.  Continue Lexapro 10 mg daily for anxiety and depression  2.  Advised her to think about psychotherapy and once she starts her new job and her finances are better to look into seeing a therapist.    Return to clinic in 6 weeks or sooner if symptoms worsen    The proposed treatment plan was discussed with the patient who was provided the opportunity to ask questions and make suggestions regarding alternative treatment. Patient verbalized understanding and expressed agreement with the plan.     Humera Mackenzie M.D.  10/12/20    This note was created using voice recognition software (Dragon). The accuracy of the dictation is limited by the abilities of the software. I have reviewed the note prior to signing, however some errors in grammar and context are still possible. If you have any questions related to this note please do not hesitate to contact our office.

## 2020-11-19 ENCOUNTER — TELEMEDICINE (OUTPATIENT)
Dept: BEHAVIORAL HEALTH | Facility: CLINIC | Age: 20
End: 2020-11-19
Payer: COMMERCIAL

## 2020-11-19 VITALS — HEIGHT: 63 IN | WEIGHT: 153 LBS | BODY MASS INDEX: 27.11 KG/M2

## 2020-11-19 DIAGNOSIS — F33.1 MDD (MAJOR DEPRESSIVE DISORDER), RECURRENT EPISODE, MODERATE (HCC): ICD-10-CM

## 2020-11-19 DIAGNOSIS — F41.1 GAD (GENERALIZED ANXIETY DISORDER): ICD-10-CM

## 2020-11-19 PROCEDURE — 99213 OFFICE O/P EST LOW 20 MIN: CPT | Mod: 95,CR | Performed by: PSYCHIATRY & NEUROLOGY

## 2020-11-19 PROCEDURE — 90833 PSYTX W PT W E/M 30 MIN: CPT | Mod: 95,CR | Performed by: PSYCHIATRY & NEUROLOGY

## 2020-11-19 RX ORDER — ESCITALOPRAM OXALATE 10 MG/1
10 TABLET ORAL DAILY
Qty: 30 TAB | Refills: 1 | Status: SHIPPED | OUTPATIENT
Start: 2020-11-19 | End: 2021-01-21 | Stop reason: DRUGHIGH

## 2020-11-19 ASSESSMENT — FIBROSIS 4 INDEX: FIB4 SCORE: 0.36

## 2020-11-19 NOTE — PROGRESS NOTES
PSYCHIATRY VIRTUAL VISIT FOLLOW-UP NOTE      Chief Complaint   Patient presents with   • Follow-Up     depression, anxiety       This evaluation was conducted via Zoom using secure and encrypted videoconferencing technology. The patient was in a private location in the [facility].    The patient's identity was confirmed and verbal consent was obtained for this virtual visit.    History Of Present Illness:  Gianna Cruz is a 20 y.o. female with major depressive disorder, generalized anxiety disorder comes in today for follow up, was last seen over 4 weeks ago.  She has been doing all right in regards to her mental health since her last visit with me.  She is working a new full-time job which she finds boring but the pain is a lot better.  She and her boyfriend are also trying to move to [place] in the next couple of months.  She and him are doing okay but there are times where she feels lonely and has a hard time communicating her feelings and often bodies that he will leave him even though there is nothing troublesome going on in the relationship.  She has a hard time letting the other person know how she is feeling or asking for help.  She does not have any big plans for the holidays.  She is still struggling with her motivation.  Sleep has been up and down.  She is eating all right but has gained some weight.  She had 1 episode of scratching when she was feeling lonely.  She continues to have her ongoing passive thoughts of death but denies any active thoughts, intent or plan of wanting to hurt herself.    Social History:   She is in a relationship, never , no kids, lives with her boyfriend in [place], employed full time at a local [employer], complicated relationship with her parents who were  and both live in [place].    Substance Use:  Alcohol - She has been drinking at least once a week since she turned 20 about a month ago and has been drinking 6-10 shots of liquor whenever she is  "drinking  Nicotine - Vapes nicotine daily  Cannabis - Denies  Illicit drugs - Denies     Past Medication Trials:  Prozac 20 mg, Zoloft 50 mg (ineffective)    Medications:  Current Outpatient Medications   Medication Sig Dispense Refill   • escitalopram (LEXAPRO) 10 MG Tab Take 1 Tab by mouth every day. 30 Tab 1   • IBUPROFEN PO Take  by mouth.     • medroxyPROGESTERone (DEPO-PROVERA) 150 MG/ML Suspension 150 mg by Intramuscular route Once.       No current facility-administered medications for this visit.        Review Of Systems:    Constitutional - Positive for fatigue  Respiratory - Negative for shortness of breath, cough  CVS - Negative for chest pain, palpitations  GI - Negative for nausea, vomiting, abdominal pain, diarrhea, constipation  Musculoskeletal - Negative for back pain  Neurological - Negative for headaches  Psychiatric - Positive for depression, anxiety, mood swings     Physical Examination:  Vital signs: Ht 1.6 m (5' 3\")   Wt 69.4 kg (153 lb)   BMI 27.10 kg/m²     Musculoskeletal: Normal gait. No abnormal movements.     Mental Status Evaluation:   General: Young white female, dressed in casual attire, good grooming and hygiene, in no apparent distress, calm and cooperative, good eye contact, no psychomotor agitation or retardation  Orientation: Alert and oriented to person, place and time  Recent and remote memory: Grossly intact  Attention span and concentration: Grossly intact  Speech: Spontaneous, normal rate, rhythm and tone  Thought Process: Linear, logical and goal directed  Thought Content: Denies suicidal or homicidal ideations, intent or plan  Perception: Denies auditory or visual hallucinations. No delusions noted  Associations: Intact  Language: Appropriate  Fund of knowledge and vocabulary: Grossly adequate  Mood: \"alright\"  Affect: Euthymic, mood congruent  Insight: Good  Judgment: Good    Depression screening:  Depression Screen (PHQ-2/PHQ-9) 11/24/2015 9/14/2020   PHQ-2 Total Score " 3 -   PHQ-2 Total Score - 6   PHQ-9 Total Score 12 -   PHQ-9 Total Score - 17     Interpretation of PHQ-9 Total Score   Score Severity   1-4 No Depression   5-9 Mild Depression   10-14 Moderate Depression   15-19 Moderately Severe Depression   20-27 Severe Depression    Medical Records/Labs/Diagnostic Tests Reviewed:  [place] - 1 prescription of Ambien found in the last 2 years, no abuse suspected       Impression:  1.  Major depressive disorder, recurrent, moderate - stable  2.  Generalized anxiety disorder - stable  3.  Borderline personality disorder traits     Plan:  1.  Continue Lexapro 10 mg daily for anxiety and depression  2.  I have advised her to cut back on her alcohol intake.  Discussed side effects and mental health with binge alcohol drinking or daily heavy drinking and relative inefficacy of medications along with heavy alcohol use.  I let her know that alcohol can emotionally numb herself which is not good for her as she already has a hard time communicating herself in a good way.  3.  Provided supportive psychotherapy and psychoeducation (> 16 minutes) in regards to her fears and anxiety.  Discussed improving her communication skills and to tell herself that it is okay to talk about her discomfort with the people she can trust.  Encouraged cutting back on alcohol use as well.  4.  I let her know that once she moves to [place] she should start looking for a therapist as well.    Return to clinic in 2 months or sooner if symptoms worsen    The proposed treatment plan was discussed with the patient who was provided the opportunity to ask questions and make suggestions regarding alternative treatment. Patient verbalized understanding and expressed agreement with the plan.     Huemra Mackenzie M.D.  11/19/20    This note was created using voice recognition software (Dragon). The accuracy of the dictation is limited by the abilities of the software. I have reviewed the note prior to signing, however  some errors in grammar and context are still possible. If you have any questions related to this note please do not hesitate to contact our office.

## 2021-01-21 ENCOUNTER — TELEMEDICINE (OUTPATIENT)
Dept: BEHAVIORAL HEALTH | Facility: CLINIC | Age: 21
End: 2021-01-21
Payer: COMMERCIAL

## 2021-01-21 VITALS — HEIGHT: 63 IN | WEIGHT: 153 LBS | BODY MASS INDEX: 27.11 KG/M2

## 2021-01-21 DIAGNOSIS — F41.1 GAD (GENERALIZED ANXIETY DISORDER): ICD-10-CM

## 2021-01-21 DIAGNOSIS — F33.1 MDD (MAJOR DEPRESSIVE DISORDER), RECURRENT EPISODE, MODERATE (HCC): ICD-10-CM

## 2021-01-21 PROCEDURE — 90833 PSYTX W PT W E/M 30 MIN: CPT | Mod: 95,CR | Performed by: PSYCHIATRY & NEUROLOGY

## 2021-01-21 PROCEDURE — 99214 OFFICE O/P EST MOD 30 MIN: CPT | Mod: 95,CR | Performed by: PSYCHIATRY & NEUROLOGY

## 2021-01-21 RX ORDER — ESCITALOPRAM OXALATE 20 MG/1
20 TABLET ORAL DAILY
Qty: 30 TAB | Refills: 1 | Status: SHIPPED | OUTPATIENT
Start: 2021-01-21 | End: 2021-04-28

## 2021-01-21 SDOH — HEALTH STABILITY: MENTAL HEALTH: HOW OFTEN DO YOU HAVE A DRINK CONTAINING ALCOHOL?: 4 OR MORE TIMES A WEEK

## 2021-01-21 ASSESSMENT — FIBROSIS 4 INDEX: FIB4 SCORE: 0.36

## 2021-01-21 NOTE — PROGRESS NOTES
PSYCHIATRY VIRTUAL VISIT FOLLOW-UP NOTE      Chief Complaint   Patient presents with   • Follow-Up     depression, anxiety       This evaluation was conducted via Zoom using secure and encrypted videoconferencing technology. The patient was in a private location in the [facility].    The patient's identity was confirmed and verbal consent was obtained for this virtual visit.    History Of Present Illness:  Gianna Cruz is a 20 y.o. female with major depressive disorder, generalized anxiety disorder comes in today for follow up, was last seen 2 months ago.  She has been struggling with worsening anxiety and depression since she broke up with her boyfriend.  She found out after they broke up that he had been cheating on her and he moved out of their home.  She is also struggling financially as she has lost a couple of jobs in the last few months.  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  She is struggling with crying spells and lack of motivation.  She has been reaching out to her mother as well as her cousin who have been supportive.  She mentions that she is still in love with her boyfriend and is having a hard time moving on.  She denies any self-harm behavior.  She has had some passive thoughts of death because of the break-up but denies any active thoughts, intent or plan of wanting to hurt herself.    Social History:   She is single, recently ended a relationship, never , no kids, lives alone in [place], unemployed, complicated relationship with her parents who were  and both live in [place].    Substance Use:  Alcohol - She has been drinking more alcohol since the break-up.  She mentions that she is drinking at least 3-4 times a week and every time she drinks she is having 3-4 shots either with her cousin or her mother.  Nicotine - Vapes nicotine daily  Cannabis - Denies  Illicit drugs - Denies  "    Past Medication Trials:  Prozac 20 mg, Zoloft 50 mg (ineffective)    Medications:  Current Outpatient Medications   Medication Sig Dispense Refill   • escitalopram (LEXAPRO) 10 MG Tab Take 1 Tab by mouth every day. 30 Tab 1   • IBUPROFEN PO Take  by mouth.     • medroxyPROGESTERone (DEPO-PROVERA) 150 MG/ML Suspension 150 mg by Intramuscular route Once.     • hyoscyamine (LEVSIN) 0.125 MG SL Tab        No current facility-administered medications for this visit.        Review Of Systems:    Constitutional - Positive for fatigue  Psychiatric - Positive for depression, anxiety    Physical Examination:  Vital signs: Ht 1.6 m (5' 3\")   Wt 69.4 kg (153 lb)   LMP  (LMP Unknown)   Breastfeeding No   BMI 27.10 kg/m²     Musculoskeletal: No abnormal movements.     Mental Status Evaluation:   General: Young white female, dressed in casual attire, good grooming and hygiene, in no apparent distress, calm and cooperative, good eye contact, no psychomotor agitation or retardation  Orientation: Alert and oriented to person, place and time  Recent and remote memory: Grossly intact  Attention span and concentration: Grossly intact  Speech: Spontaneous, normal rate, rhythm and tone  Thought Process: Linear, logical and goal directed  Thought Content: Denies suicidal or homicidal ideations, intent or plan  Perception: Denies auditory or visual hallucinations. No delusions noted  Associations: Intact  Language: Appropriate  Fund of knowledge and vocabulary: Grossly adequate  Mood: \"am doing okay\"  Affect: Dysphoric, mood congruent  Insight: Good  Judgment: Good    Depression screening:  Depression Screen (PHQ-2/PHQ-9) 11/24/2015 9/14/2020   PHQ-2 Total Score 3 -   PHQ-2 Total Score - 6   PHQ-9 Total Score 12 -   PHQ-9 Total Score - 17     Interpretation of PHQ-9 Total Score   Score Severity   1-4 No Depression   5-9 Mild Depression   10-14 Moderate Depression   15-19 Moderately Severe Depression   20-27 Severe " Depression    Medical Records/Labs/Diagnostic Tests Reviewed:  [place]  records - 1 prescription of Ambien found in the last 2 years, no abuse suspected       Impression:  1.  Major depressive disorder, recurrent, moderate   2.  Generalized anxiety disorder   3.  Borderline personality disorder traits     Plan:  1.  Increase Lexapro to 20 mg daily for anxiety and depression  2.  I informed her about talk space and asked her to find a therapist through the.  She lives in [place] resources are limited and she does not have the financial resources to travel to [place] or [place] to do therapy.   3.  I have again asked her to minimize her alcohol use which is not helping her mental health  4.  Provided supportive psychotherapy (> 16 minutes) in regards to her recent break-up, depression and anxiety.  Provided emotional validation on how she has been feeling since the break-up and how getting back with him is going to affect her self-esteem.  Encouraged her to take this time to focus on herself and to get the financial independence and job stability.  Advised her to talk about her feelings rather than using alcohol to numb them up.    Return to clinic in 4 weeks or sooner if symptoms worsen    The proposed treatment plan was discussed with the patient who was provided the opportunity to ask questions and make suggestions regarding alternative treatment. Patient verbalized understanding and expressed agreement with the plan.     Humera Mackenzie M.D.  01/21/21    This note was created using voice recognition software (Dragon). The accuracy of the dictation is limited by the abilities of the software. I have reviewed the note prior to signing, however some errors in grammar and context are still possible. If you have any questions related to this note please do not hesitate to contact our office.

## 2021-06-04 ENCOUNTER — TELEMEDICINE (OUTPATIENT)
Dept: BEHAVIORAL HEALTH | Facility: CLINIC | Age: 21
End: 2021-06-04
Payer: COMMERCIAL

## 2021-06-04 VITALS — HEIGHT: 63 IN | BODY MASS INDEX: 27.1 KG/M2

## 2021-06-04 DIAGNOSIS — F33.1 MDD (MAJOR DEPRESSIVE DISORDER), RECURRENT EPISODE, MODERATE (HCC): ICD-10-CM

## 2021-06-04 DIAGNOSIS — F41.1 GAD (GENERALIZED ANXIETY DISORDER): ICD-10-CM

## 2021-06-04 DIAGNOSIS — F10.10 EXCESSIVE DRINKING ALCOHOL: ICD-10-CM

## 2021-06-04 PROBLEM — F33.0 MDD (MAJOR DEPRESSIVE DISORDER), RECURRENT EPISODE, MILD (HCC): Status: ACTIVE | Noted: 2020-09-14

## 2021-06-04 PROBLEM — F33.0 MDD (MAJOR DEPRESSIVE DISORDER), RECURRENT EPISODE, MILD (HCC): Status: RESOLVED | Noted: 2020-09-14 | Resolved: 2021-06-04

## 2021-06-04 PROCEDURE — 90833 PSYTX W PT W E/M 30 MIN: CPT | Mod: 95 | Performed by: PSYCHIATRY & NEUROLOGY

## 2021-06-04 PROCEDURE — 99214 OFFICE O/P EST MOD 30 MIN: CPT | Mod: 95 | Performed by: PSYCHIATRY & NEUROLOGY

## 2021-06-04 RX ORDER — ESCITALOPRAM OXALATE 20 MG/1
20 TABLET ORAL DAILY
Qty: 30 TABLET | Refills: 1 | Status: SHIPPED | OUTPATIENT
Start: 2021-06-04 | End: 2021-08-27

## 2021-06-04 RX ORDER — ESCITALOPRAM OXALATE 20 MG/1
20 TABLET ORAL DAILY
Qty: 30 TABLET | Refills: 0 | Status: SHIPPED | OUTPATIENT
Start: 2021-06-04 | End: 2021-06-04 | Stop reason: SDUPTHER

## 2021-06-04 NOTE — PROGRESS NOTES
PSYCHIATRY VIRTUAL VISIT FOLLOW-UP NOTE      Chief Complaint   Patient presents with   • Follow-Up     depression, anxiety       This evaluation was conducted via Zoom using secure and encrypted videoconferencing technology. The patient was in a private location in the state of [place].    The patient's identity was confirmed and verbal consent was obtained for this virtual visit.    History Of Present Illness:  Gianna Cruz is a 20 y.o. female with major depressive disorder, generalized anxiety disorder comes in today for follow up, was last seen over 4 months ago.  She is having good and bad days in regards to depression and anxiety.  She has been taking the higher dose of Lexapro and it does help her symptoms but she still does not feel that she is back to normal.  She is having a hard time getting over her last relationship.  She feels that she is still in love with her ex-boyfriend and wants to have a relationship with him but is not sure if she can trust him or not.  She has been talking to him on a regular basis and he does not want to be in a relationship with her.  She has been working a new job which has been going well for her.  She is currently in [place] with her mother and sister for vacation and is having a nice time.  She has had some difficulties with sleep and appetite.  She denies any self-harm behavior.  She continues to have on and off thoughts of death but denies having any active thoughts, intent or plan of wanting to hurt herself.    Social History:   She is single, lives alone in [place], parents are  and both live in [place], close with mother but no longer has a relationship with father, employed full time at [employer] in [place].    Substance Use:  Alcohol - She has been drinking alcohol on daily basis and is drinking 3-5 shots every time she drinks.  Nicotine - Vapes nicotine daily  Cannabis - Denies  Illicit drugs - Denies     Past Medication Trials:  Prozac 20 mg, Zoloft 50 mg  "(ineffective)    Medications:  Current Outpatient Medications   Medication Sig Dispense Refill   • escitalopram (LEXAPRO) 20 MG tablet Take 1 tablet by mouth every day. 30 tablet 0   • hyoscyamine (LEVSIN) 0.125 MG SL Tab      • IBUPROFEN PO Take  by mouth.     • medroxyPROGESTERone (DEPO-PROVERA) 150 MG/ML Suspension 150 mg by Intramuscular route Once.       No current facility-administered medications for this visit.       Review Of Systems:    Constitutional - Positive for fatigue  Psychiatric - Positive for depression, anxiety, poor sleep    Physical Examination:  Vital signs: Ht 1.6 m (5' 3\")   BMI 27.10 kg/m²     Musculoskeletal: No abnormal movements.     Mental Status Evaluation:   General: Young white female, dressed in casual attire, good grooming and hygiene, in no apparent distress, calm and cooperative, good eye contact, no psychomotor agitation or retardation  Orientation: Alert and oriented to person, place and time  Recent and remote memory: Grossly intact  Attention span and concentration: Grossly intact  Speech: Spontaneous, normal rate, rhythm and tone  Thought Process: Linear, logical and goal directed  Thought Content: Denies suicidal or homicidal ideations, intent or plan  Perception: Denies auditory or visual hallucinations. No delusions noted  Associations: Intact  Language: Appropriate  Fund of knowledge and vocabulary: Grossly adequate  Mood: \"okay\"  Affect: Dysphoric, mood congruent  Insight: Good  Judgment: Good    Depression screening:  Depression Screen (PHQ-2/PHQ-9) 11/24/2015 9/14/2020   PHQ-2 Total Score 3 -   PHQ-2 Total Score - 6   PHQ-9 Total Score 12 -   PHQ-9 Total Score - 17     Interpretation of PHQ-9 Total Score   Score Severity   1-4 No Depression   5-9 Mild Depression   10-14 Moderate Depression   15-19 Moderately Severe Depression   20-27 Severe Depression    Medical Records/Labs/Diagnostic Tests Reviewed:  [place] - 1 prescription of Ambien found in the last 2 " years, no abuse suspected       Impression:  1.  Major depressive disorder, recurrent, moderate - stable  2.  Generalized anxiety disorder - stable  3.  Excessive alcohol use - new problem    Plan:  1.  Continue Lexapro 20 mg daily for anxiety and depression  2.  Referral placed for psychotherapy  3.  I have encouraged her to cut back on her alcohol intake.  Discussed how alcohol can worsen her mental health and importance of cutting back on her alcohol intake.  4.  Provided supportive psychotherapy (> 16 minutes) in regards to anxiety and depression.  Provided emotional support in regards to her feelings related to her last relationship.  Encouraged her to focus on herself and think more seriously about this relationship.  Encouraged her to focus on the lack of trust and what she wants long-term from this relationship.    Return to clinic in 2 months or sooner if symptoms worsen    The proposed treatment plan was discussed with the patient who was provided the opportunity to ask questions and make suggestions regarding alternative treatment. Patient verbalized understanding and expressed agreement with the plan.     Humera Mackenzie M.D.  06/04/21    This note was created using voice recognition software (Dragon). The accuracy of the dictation is limited by the abilities of the software. I have reviewed the note prior to signing, however some errors in grammar and context are still possible. If you have any questions related to this note please do not hesitate to contact our office.

## 2021-07-19 ENCOUNTER — NON-PROVIDER VISIT (OUTPATIENT)
Dept: URGENT CARE | Facility: PHYSICIAN GROUP | Age: 21
End: 2021-07-19

## 2021-07-19 DIAGNOSIS — Z02.1 PRE-EMPLOYMENT DRUG SCREENING: ICD-10-CM

## 2021-07-19 LAB
AMP AMPHETAMINE: NORMAL
AMP AMPHETAMINE: NORMAL
BAR BARBITURATES: NORMAL
BZO BENZODIAZEPINES: NORMAL
COC COCAINE: NORMAL
COC COCAINE: NORMAL
INT CON NEG: NORMAL
INT CON NEG: NORMAL
INT CON POS: NORMAL
INT CON POS: NORMAL
MDMA ECSTASY: NORMAL
MET METHAMPHETAMINES: NORMAL
MET METHAMPHETAMINES: NORMAL
MTD METHADONE: NORMAL
OPI OPIATES: NORMAL
OPI OPIATES: NORMAL
OXY OXYCODONE: NORMAL
PCP PHENCYCLIDINE: NORMAL
PCP PHENCYCLIDINE: NORMAL
POC DRUG COMMENT 753798-OCCUPATIONAL HEALTH: NEGATIVE
POC URINE DRUG SCREEN OCDRS: NEGATIVE
THC: NORMAL
THC: NORMAL

## 2021-07-19 PROCEDURE — 80305 DRUG TEST PRSMV DIR OPT OBS: CPT | Performed by: PHYSICIAN ASSISTANT

## 2021-09-22 RX ORDER — ESCITALOPRAM OXALATE 20 MG/1
20 TABLET ORAL DAILY
Qty: 30 TABLET | Refills: 0 | Status: SHIPPED | OUTPATIENT
Start: 2021-09-22 | End: 2021-11-18

## 2021-10-25 ENCOUNTER — OFFICE VISIT (OUTPATIENT)
Dept: URGENT CARE | Facility: PHYSICIAN GROUP | Age: 21
End: 2021-10-25
Payer: COMMERCIAL

## 2021-10-25 VITALS
OXYGEN SATURATION: 97 % | SYSTOLIC BLOOD PRESSURE: 102 MMHG | TEMPERATURE: 98.5 F | DIASTOLIC BLOOD PRESSURE: 66 MMHG | RESPIRATION RATE: 16 BRPM | WEIGHT: 146 LBS | BODY MASS INDEX: 25.87 KG/M2 | HEART RATE: 82 BPM | HEIGHT: 63 IN

## 2021-10-25 DIAGNOSIS — J40 BRONCHITIS: ICD-10-CM

## 2021-10-25 PROCEDURE — 99213 OFFICE O/P EST LOW 20 MIN: CPT | Performed by: PHYSICIAN ASSISTANT

## 2021-10-25 RX ORDER — DEXTROMETHORPHAN HYDROBROMIDE AND PROMETHAZINE HYDROCHLORIDE 15; 6.25 MG/5ML; MG/5ML
5 SYRUP ORAL
Qty: 50 ML | Refills: 0 | Status: SHIPPED | OUTPATIENT
Start: 2021-10-25 | End: 2022-01-05

## 2021-10-25 RX ORDER — ONDANSETRON 4 MG/1
TABLET, ORALLY DISINTEGRATING ORAL
COMMUNITY
Start: 2021-10-21 | End: 2022-01-05

## 2021-10-25 RX ORDER — METHYLPREDNISOLONE 4 MG/1
TABLET ORAL
Qty: 21 TABLET | Refills: 0 | Status: SHIPPED | OUTPATIENT
Start: 2021-10-25 | End: 2022-01-05

## 2021-10-25 ASSESSMENT — ENCOUNTER SYMPTOMS
SORE THROAT: 1
HEADACHES: 0
COUGH: 1
DIARRHEA: 0
SHORTNESS OF BREATH: 0
CHILLS: 0
ABDOMINAL PAIN: 0
WHEEZING: 0
SPUTUM PRODUCTION: 0
FEVER: 0
NAUSEA: 0

## 2021-10-25 ASSESSMENT — FIBROSIS 4 INDEX: FIB4 SCORE: 0.38

## 2021-10-25 NOTE — PROGRESS NOTES
"Subjective:   Gianna Cruz is a 21 y.o. female who presents for Cough (x 3 weeks)      HPI  21 y.o. female presents to urgent care with new problem to provider of persistent dry cough over the last 3 weeks.  Confirmed exposure to RSV about 2 to 3 weeks ago.  Patient denies fevers.  She denies chest pain or shortness of breath.  No history of asthma or pneumonia.  She denies confirmed exposure to COVID-19. Denies other associated aggravating or alleviating factors.     Review of Systems   Constitutional: Negative for chills, fever and malaise/fatigue.   HENT: Positive for congestion and sore throat. Negative for ear pain.    Respiratory: Positive for cough. Negative for sputum production, shortness of breath and wheezing.    Cardiovascular: Negative for chest pain.   Gastrointestinal: Negative for abdominal pain, diarrhea and nausea.        Posttussive vomiting   Neurological: Negative for headaches.       Patient Active Problem List   Diagnosis   • JOSEP (generalized anxiety disorder)   • Excessive drinking alcohol   • MDD (major depressive disorder), recurrent episode, moderate (HCC)     History reviewed. No pertinent surgical history.  Social History     Tobacco Use   • Smoking status: Current Every Day Smoker     Packs/day: 0.00   • Smokeless tobacco: Never Used   • Tobacco comment: vapes   Vaping Use   • Vaping Use: Every day   • Substances: Nicotine   Substance Use Topics   • Alcohol use: Yes     Comment: 3-5 shots daily   • Drug use: No      Family History   Problem Relation Age of Onset   • Depression Mother    • No Known Problems Father    • Depression Sister    • Alcohol abuse Paternal Uncle    • Depression Maternal Grandmother       (Allergies, Medications, & Tobacco/Substance Use were reconciled by the Medical Assistant and reviewed by myself. The family history is prepopulated)     Objective:     /66   Pulse 82   Temp 36.9 °C (98.5 °F) (Temporal)   Resp 16   Ht 1.6 m (5' 3\")   Wt 66.2 kg " (146 lb)   SpO2 97%   BMI 25.86 kg/m²     Physical Exam  Vitals reviewed.   Constitutional:       General: She is not in acute distress.     Appearance: Normal appearance. She is well-developed. She is not ill-appearing.   HENT:      Head: Normocephalic and atraumatic.      Nose: Congestion present.      Mouth/Throat:      Mouth: Mucous membranes are dry.      Pharynx: Oropharynx is clear. Posterior oropharyngeal erythema present. No oropharyngeal exudate.   Eyes:      Conjunctiva/sclera: Conjunctivae normal.   Cardiovascular:      Rate and Rhythm: Normal rate and regular rhythm.      Heart sounds: Normal heart sounds.   Pulmonary:      Effort: Pulmonary effort is normal. No respiratory distress.      Breath sounds: Normal breath sounds.      Comments: Dry cough on exam  Musculoskeletal:      Cervical back: Normal range of motion and neck supple.   Lymphadenopathy:      Cervical: No cervical adenopathy.   Skin:     General: Skin is warm and dry.   Neurological:      General: No focal deficit present.      Mental Status: She is alert and oriented to person, place, and time.   Psychiatric:         Mood and Affect: Mood normal.         Behavior: Behavior normal.         Thought Content: Thought content normal.         Judgment: Judgment normal.         Assessment/Plan:     1. Bronchitis  methylPREDNISolone (MEDROL DOSEPAK) 4 MG Tablet Therapy Pack    promethazine-dextromethorphan (PROMETHAZINE-DM) 6.25-15 MG/5ML syrup     Advised patient that symptoms are most likely viral etiology.  Prescription cough suppressant to take before bed.  Medrol Dosepak to treat inflamed bronchioles.  She may continue to take OTC T medication as needed for symptomatic relief.  Return for reevaluation if symptoms persist or worsen.  Patient has no fevers, her vital signs are stable, and her lung sounds are clear to auscultation bilaterally.    Differential diagnosis, natural history, supportive care, and indications for immediate  follow-up discussed.    Advised the patient to follow-up with the primary care physician for recheck, reevaluation, and consideration of further management.  Patient verbalized understanding of treatment plan and has no further questions regarding care.     I personally reviewed prior external notes and test results pertinent to today's visit.   Please note that this dictation was created using voice recognition software. I have made a reasonable attempt to correct obvious errors, but I expect that there are errors of grammar and possibly content that I did not discover before finalizing the note.    This note was electronically signed by Khadra Cruz PA-C

## 2022-01-05 ENCOUNTER — TELEMEDICINE (OUTPATIENT)
Dept: BEHAVIORAL HEALTH | Facility: CLINIC | Age: 22
End: 2022-01-05
Payer: COMMERCIAL

## 2022-01-05 VITALS — HEIGHT: 63 IN | BODY MASS INDEX: 25.86 KG/M2

## 2022-01-05 DIAGNOSIS — F33.1 MDD (MAJOR DEPRESSIVE DISORDER), RECURRENT EPISODE, MODERATE (HCC): ICD-10-CM

## 2022-01-05 DIAGNOSIS — F41.1 GAD (GENERALIZED ANXIETY DISORDER): ICD-10-CM

## 2022-01-05 DIAGNOSIS — F10.20 ALCOHOL USE DISORDER, MODERATE, DEPENDENCE (HCC): ICD-10-CM

## 2022-01-05 PROBLEM — F10.10 EXCESSIVE DRINKING ALCOHOL: Status: RESOLVED | Noted: 2021-06-04 | Resolved: 2022-01-05

## 2022-01-05 PROCEDURE — 99214 OFFICE O/P EST MOD 30 MIN: CPT | Mod: 95 | Performed by: PSYCHIATRY & NEUROLOGY

## 2022-01-05 PROCEDURE — 90833 PSYTX W PT W E/M 30 MIN: CPT | Mod: 95 | Performed by: PSYCHIATRY & NEUROLOGY

## 2022-01-05 RX ORDER — ESCITALOPRAM OXALATE 20 MG/1
20 TABLET ORAL DAILY
Qty: 30 TABLET | Refills: 1 | Status: SHIPPED | OUTPATIENT
Start: 2022-01-05 | End: 2022-03-03 | Stop reason: SDUPTHER

## 2022-01-05 ASSESSMENT — PATIENT HEALTH QUESTIONNAIRE - PHQ9
5. POOR APPETITE OR OVEREATING: 3 - NEARLY EVERY DAY
SUM OF ALL RESPONSES TO PHQ QUESTIONS 1-9: 20
CLINICAL INTERPRETATION OF PHQ2 SCORE: 5

## 2022-01-05 NOTE — PROGRESS NOTES
PSYCHIATRY VIRTUAL VISIT FOLLOW-UP NOTE      Chief Complaint   Patient presents with   • Follow-Up     depression, anxiety       This evaluation was conducted via Zoom using secure and encrypted videoconferencing technology. The patient was in a private location in the [facility].    The patient's identity was confirmed and verbal consent was obtained for this virtual visit.    History Of Present Illness:  Gianna Cruz is a 21 y.o. female with major depressive disorder, generalized anxiety disorder comes in today for follow up, was last seen 7 months ago she continues to have good and bad days in regards to her mental health.  She has been compliant with Lexapro but does feel that there are days where her depression and anxiety are overwhelming.  She is in a new relationship for the last 2 months and it has been going well.  However, she is taking care of his 2 little girls who are 2 and 3 years old.  His stepson is also with them every other weekend.  She mentions that this is a temporary situation as his ex-girlfriend is looking into moving to a different state and the girls might move up with her.  She is no longer working as her alcohol use was affecting her ability to keep her job.  She has continuously been drinking more and more alcohol.  She is drinking vodka on a daily basis to the point where she does not even know how much she is drinking.  She is drinking throughout the day to avoid having withdrawal symptoms.  She feels that alcohol probably affected her last relationship and it does cause conflict with her current boyfriend as well.  She mentioned that her mother is also struggling with an alcohol addiction.  She continues to have ups and downs with her appetite and sleep.  She has had passive thoughts of death but denies dwelling on those thoughts or having any self-harm behavior.  She denies having active thoughts, intent or plan of wanting to hurt herself.    Social History:   She is in a  "relationship, lives with boyfriend and his 2 daughters and son (is with them every other weekend) in [place], parents are  and both live in [place], close with mother but no longer has a relationship with father, unemployed.    Substance Use:  Alcohol - See HPI for details  Nicotine - Vapes nicotine daily  Cannabis - Denies  Illicit drugs - Denies     Past Medication Trials:  Prozac 20 mg, Zoloft 50 mg (ineffective)    Medications:  Current Outpatient Medications   Medication Sig Dispense Refill   • escitalopram (LEXAPRO) 20 MG tablet Take 1 Tablet by mouth every day. 30 Tablet 1     No current facility-administered medications for this visit.       Review Of Systems:    Constitutional - Positive for fatigue  Psychiatric - Positive for depression, anxiety, poor sleep, excessive alcohol use    Physical Examination:  Vital signs: Ht 1.6 m (5' 3\")   BMI 25.86 kg/m²     Musculoskeletal: No abnormal movements.     Mental Status Evaluation:   General: Young white female, dressed in casual attire, good grooming and hygiene, in no apparent distress, calm and cooperative, good eye contact, no psychomotor agitation or retardation  Orientation: Alert and oriented to person, place and time  Recent and remote memory: Grossly intact  Attention span and concentration: Grossly intact  Speech: Spontaneous, normal rate, rhythm and tone  Thought Process: Linear, logical and goal directed  Thought Content: Denies suicidal or homicidal ideations, intent or plan  Perception: Denies auditory or visual hallucinations. No delusions noted  Associations: Intact  Language: Appropriate  Fund of knowledge and vocabulary: Grossly adequate  Mood: \"okay\"  Affect: Dysphoric, mood congruent  Insight: Good  Judgment: Good    Depression screening:  Depression Screen (PHQ-2/PHQ-9) 11/24/2015 9/14/2020 1/5/2022   PHQ-2 Total Score 3 - -   PHQ-2 Total Score - 6 5   PHQ-9 Total Score 12 - -   PHQ-9 Total Score - 17 20     Interpretation of PHQ-9 " Total Score   Score Severity   1-4 No Depression   5-9 Mild Depression   10-14 Moderate Depression   15-19 Moderately Severe Depression   20-27 Severe Depression    Medical Records/Labs/Diagnostic Tests Reviewed:  [place] PDMP records - 1 prescription of Ambien found in the last 2 years, no abuse suspected       Impression:  1.  Major depressive disorder, recurrent, moderate - stable  2.  Generalized anxiety disorder - stable  3.  Alcohol use disorder, moderate - new problem    Plan:  1.  Continue Lexapro 20 mg daily for anxiety and depression  2.  I encouraged her to slowly cut down on her alcohol intake to minimize withdrawal symptoms.  Discussed how alcohol can be detrimental to her physical and mental health and limits what antidepressant can do for her.  She was encouraged to think about intensive outpatient program and talk to her boyfriend.  3.  I have talked to her about individual psychotherapy several times and she was again encouraged to start psychotherapy especially if she is unable to do intensive outpatient program..  4.  Provided supportive psychotherapy (> 16 minutes) and psychoeducation.  Discussed effects of alcohol on her mental health and why she needs to cut down on her alcohol intake.  Discussed how she can be functional adult and get a job if she focuses on herself and her mental health.    Return to clinic in 2 months or sooner if symptoms worsen    The proposed treatment plan was discussed with the patient who was provided the opportunity to ask questions and make suggestions regarding alternative treatment. Patient verbalized understanding and expressed agreement with the plan.     Humera Mackenzie M.D.  01/05/22    This note was created using voice recognition software (Dragon). The accuracy of the dictation is limited by the abilities of the software. I have reviewed the note prior to signing, however some errors in grammar and context are still possible. If you have any questions related to  this note please do not hesitate to contact our office.

## 2022-01-13 ENCOUNTER — HOSPITAL ENCOUNTER (OUTPATIENT)
Dept: BEHAVIORAL HEALTH | Facility: MEDICAL CENTER | Age: 22
End: 2022-01-13
Attending: FAMILY MEDICINE
Payer: COMMERCIAL

## 2022-01-13 VITALS — HEART RATE: 97 BPM | OXYGEN SATURATION: 95 % | DIASTOLIC BLOOD PRESSURE: 66 MMHG | SYSTOLIC BLOOD PRESSURE: 110 MMHG

## 2022-01-13 DIAGNOSIS — F10.20 ALCOHOL USE DISORDER, MODERATE, DEPENDENCE (HCC): ICD-10-CM

## 2022-01-13 DIAGNOSIS — F41.1 GAD (GENERALIZED ANXIETY DISORDER): ICD-10-CM

## 2022-01-13 DIAGNOSIS — F33.1 MDD (MAJOR DEPRESSIVE DISORDER), RECURRENT EPISODE, MODERATE (HCC): ICD-10-CM

## 2022-01-13 PROCEDURE — 99204 OFFICE O/P NEW MOD 45 MIN: CPT | Performed by: FAMILY MEDICINE

## 2022-01-13 PROCEDURE — 99214 OFFICE O/P EST MOD 30 MIN: CPT | Performed by: FAMILY MEDICINE

## 2022-01-13 RX ORDER — NALTREXONE HYDROCHLORIDE 50 MG/1
50 TABLET, FILM COATED ORAL DAILY
Qty: 30 TABLET | Refills: 0 | Status: SHIPPED | OUTPATIENT
Start: 2022-01-13 | End: 2022-11-21

## 2022-01-13 RX ORDER — ACAMPROSATE CALCIUM 333 MG/1
333 TABLET, DELAYED RELEASE ORAL 3 TIMES DAILY
Qty: 90 TABLET | Refills: 3 | Status: SHIPPED | OUTPATIENT
Start: 2022-01-13 | End: 2022-11-21

## 2022-01-13 ASSESSMENT — ENCOUNTER SYMPTOMS
DOUBLE VISION: 0
DEPRESSION: 1
HEMOPTYSIS: 0
TINGLING: 0
CHILLS: 0
CARDIOVASCULAR NEGATIVE: 1
HEADACHES: 0
HEARTBURN: 0
MYALGIAS: 0
COUGH: 0
FEVER: 0
EYES NEGATIVE: 1
GASTROINTESTINAL NEGATIVE: 1
CONSTITUTIONAL NEGATIVE: 1
NAUSEA: 0
BRUISES/BLEEDS EASILY: 0
PALPITATIONS: 0
RESPIRATORY NEGATIVE: 1
MUSCULOSKELETAL NEGATIVE: 1
DIZZINESS: 0
NEUROLOGICAL NEGATIVE: 1
BLURRED VISION: 0

## 2022-01-13 ASSESSMENT — LIFESTYLE VARIABLES: SUBSTANCE_ABUSE: 1

## 2022-01-14 NOTE — PROGRESS NOTES
Psychiatric Progress Note               Author: Armando Jernigan M.D. Date & Time created: 1/13/2022  4:46 PM     Interval History:  New patient visit, 22 y/o sahm who began drinking recreationally and to deal with emotions one year ago and has had it escalate  Comes with with live in  who is supportive and wants to  Try and stop  Now drinking 7 - 15 shots of vodka per day. First drink is when she wakes up and she has n/v without it. No history of dt or sz  Long discussion with patient and bf and they decline inpatient rehab at this time  They would like to try meds to help with cravings and slowly taper off her use  Counseled on doing this with reduction in 25% of intake per week  Will start on naltrexone and campral and f/u in 4 weeks for efficacy  Today the patient was counseled on avoidance of people, places and things that could be triggers for substance use  Will start counseling in [place] for avoidance of triggers    Review of Systems:  Review of Systems   Constitutional: Negative.  Negative for chills and fever.   HENT: Negative.  Negative for hearing loss.    Eyes: Negative.  Negative for blurred vision and double vision.   Respiratory: Negative.  Negative for cough and hemoptysis.    Cardiovascular: Negative.  Negative for chest pain and palpitations.   Gastrointestinal: Negative.  Negative for heartburn and nausea.   Genitourinary: Negative.  Negative for dysuria.   Musculoskeletal: Negative.  Negative for myalgias.   Skin: Negative.  Negative for rash.   Neurological: Negative.  Negative for dizziness, tingling and headaches.   Endo/Heme/Allergies: Negative.  Does not bruise/bleed easily.   Psychiatric/Behavioral: Positive for depression and substance abuse. Negative for suicidal ideas.   All other systems reviewed and are negative.      Physical Exam:  Physical Exam  Vitals and nursing note reviewed.   Constitutional:       General: She is not in acute distress.     Appearance: She is well-developed.  She is not diaphoretic.   HENT:      Head: Normocephalic and atraumatic.      Mouth/Throat:      Pharynx: No oropharyngeal exudate.   Eyes:      Pupils: Pupils are equal, round, and reactive to light.   Cardiovascular:      Rate and Rhythm: Normal rate and regular rhythm.      Heart sounds: Normal heart sounds. No murmur heard.  No friction rub. No gallop.    Pulmonary:      Effort: Pulmonary effort is normal. No respiratory distress.      Breath sounds: Normal breath sounds. No wheezing or rales.   Chest:      Chest wall: No tenderness.   Neurological:      Mental Status: She is alert and oriented to person, place, and time.   Psychiatric:         Behavior: Behavior normal.         Thought Content: Thought content normal.         Judgment: Judgment normal.         Labs:  No results found for this or any previous visit (from the past 24 hour(s)).    Hemodynamics:  No data recorded.     Pulse  Av  Min: 97  Max: 97  Blood Pressure: 110/66     Respiratory:    Pulse Oximetry: 95 %           Fluids:  No intake or output data in the 24 hours ending 22 1646     GI/Nutrition:  No orders of the defined types were placed in this encounter.    Medications:  Current Outpatient Medications   Medication   • naltrexone (DEPADE) 50 MG Tab   • acamprosate (CAMPRAL) 333 MG tablet   • escitalopram (LEXAPRO) 20 MG tablet     No current facility-administered medications for this encounter.     Medical Decision Making, by Problem:  There are no active hospital problems to display for this patient.    Plan:        1. Alcohol use disorder, moderate, dependence (HCC)  naltrexone (DEPADE) 50 MG Tab    Referral to Psychology    acamprosate (CAMPRAL) 333 MG tablet    FREE THYROXINE    Comp Metabolic Panel    TRIIDOTHYRONINE    CBC WITHOUT DIFFERENTIAL    FOLATE    VITAMIN B12   2. MDD (major depressive disorder), recurrent episode, moderate (HCC)     3. JOSEP (generalized anxiety disorder)

## 2022-02-24 ENCOUNTER — APPOINTMENT (OUTPATIENT)
Dept: BEHAVIORAL HEALTH | Facility: MEDICAL CENTER | Age: 22
End: 2022-02-24
Attending: FAMILY MEDICINE
Payer: COMMERCIAL

## 2022-03-03 ENCOUNTER — TELEMEDICINE (OUTPATIENT)
Dept: BEHAVIORAL HEALTH | Facility: CLINIC | Age: 22
End: 2022-03-03
Payer: COMMERCIAL

## 2022-03-03 VITALS — HEIGHT: 63 IN | BODY MASS INDEX: 25.86 KG/M2

## 2022-03-03 DIAGNOSIS — F41.1 GAD (GENERALIZED ANXIETY DISORDER): ICD-10-CM

## 2022-03-03 DIAGNOSIS — F10.20 ALCOHOL USE DISORDER, MODERATE, DEPENDENCE (HCC): ICD-10-CM

## 2022-03-03 DIAGNOSIS — F33.0 MDD (MAJOR DEPRESSIVE DISORDER), RECURRENT EPISODE, MILD (HCC): ICD-10-CM

## 2022-03-03 PROBLEM — F33.1 MDD (MAJOR DEPRESSIVE DISORDER), RECURRENT EPISODE, MODERATE (HCC): Status: RESOLVED | Noted: 2021-06-04 | Resolved: 2022-03-03

## 2022-03-03 PROCEDURE — 90833 PSYTX W PT W E/M 30 MIN: CPT | Mod: 95 | Performed by: PSYCHIATRY & NEUROLOGY

## 2022-03-03 PROCEDURE — 99214 OFFICE O/P EST MOD 30 MIN: CPT | Mod: 95 | Performed by: PSYCHIATRY & NEUROLOGY

## 2022-03-03 RX ORDER — ESCITALOPRAM OXALATE 20 MG/1
20 TABLET ORAL DAILY
Qty: 30 TABLET | Refills: 1 | Status: SHIPPED | OUTPATIENT
Start: 2022-03-03 | End: 2022-05-20

## 2022-03-03 RX ORDER — BUSPIRONE HYDROCHLORIDE 15 MG/1
TABLET ORAL
Qty: 106 TABLET | Refills: 0 | Status: SHIPPED | OUTPATIENT
Start: 2022-03-03 | End: 2022-05-02

## 2022-03-03 NOTE — PROGRESS NOTES
PSYCHIATRY VIRTUAL VISIT FOLLOW-UP NOTE      Chief Complaint   Patient presents with   • Follow-Up     Depression, anxiety       This evaluation was conducted via Zoom using secure and encrypted videoconferencing technology.   The patient was in their home in the [facility].    The patient's identity was confirmed and verbal consent was obtained for this virtual visit.    History Of Present Illness:  Gianna Cruz is a 21 y.o. female with major depressive disorder, generalized anxiety disorder comes in today for follow up, was last seen 2 months ago. She is doing better in regards to her mood but has been noticing increased struggles with anxiety. She stopped drinking alcohol about 4 weeks ago and is feeling really good about it. She has noticed that with her alcohol she has more motivation and is able to accomplish more. She is having a lot less episodes of anger outbursts with mood swings. She is sleeping well but better as well. She is noticing that she is anxious all the time. She continues to take care of her boyfriend's daughters who are 2 and 3 years old and mentions that they will be taking his ex-girlfriend to court for custody issues. She feels all right now working and staying at home taking care of his .girls. She denies any self-harm behavior. She denies having thoughts of wanting to hurt herself.    Social History:   She is in a relationship, lives with boyfriend and his 2 daughters and son (every other weekend) in [place], parents are  and both live in [place], close with mother but no longer has relationship with father, unemployed.    Substance Use:  Alcohol - Denies alcohol in the last 4 weeks  Nicotine - Vapes nicotine daily  Cannabis - Denies  Illicit drugs - Denies     Past Medication Trials:  Prozac 20 mg, Zoloft 50 mg (ineffective)    Medications:  Current Outpatient Medications   Medication Sig Dispense Refill   • escitalopram (LEXAPRO) 20 MG tablet Take 1 Tablet by mouth  "every day. 30 Tablet 1   • naltrexone (DEPADE) 50 MG Tab Take 1 Tablet by mouth every day. (Patient not taking: Reported on 3/3/2022) 30 Tablet 0   • acamprosate (CAMPRAL) 333 MG tablet Take 1 Tablet by mouth 3 times a day. (Patient not taking: Reported on 3/3/2022) 90 Tablet 3     No current facility-administered medications for this visit.       Review Of Systems:    Constitutional - Positive for fatigue  Psychiatric - Positive for depression, anxiety, poor sleep    Physical Examination:  Vital signs: Ht 1.6 m (5' 3\")   BMI 25.86 kg/m²     Musculoskeletal: No abnormal movements.     Mental Status Evaluation:   General: Young white female, dressed in casual attire, good grooming and hygiene, in no apparent distress, calm and cooperative, good eye contact, no psychomotor agitation or retardation  Orientation: Alert and oriented to person, place and time  Recent and remote memory: Grossly intact  Attention span and concentration: Grossly intact  Speech: Spontaneous, normal rate, rhythm and tone  Thought Process: Linear, logical and goal directed  Thought Content: Denies suicidal or homicidal ideations, intent or plan  Perception: Denies auditory or visual hallucinations. No delusions noted  Associations: Intact  Language: Appropriate  Fund of knowledge and vocabulary: Grossly adequate  Mood: \"okay\"  Affect: Dysphoric, mood congruent  Insight: Good  Judgment: Good    Depression screening:  Depression Screen (PHQ-2/PHQ-9) 11/24/2015 9/14/2020 1/5/2022   PHQ-2 Total Score 3 - -   PHQ-2 Total Score - 6 5   PHQ-9 Total Score 12 - -   PHQ-9 Total Score - 17 20     Interpretation of PHQ-9 Total Score   Score Severity   1-4 No Depression   5-9 Mild Depression   10-14 Moderate Depression   15-19 Moderately Severe Depression   20-27 Severe Depression    Medical Records/Labs/Diagnostic Tests Reviewed:  [place] PDMP records - no prescribed controlled medications in the last 2 years      Impression:  1.  Major depressive disorder, " recurrent, moderate - improving   2.  Generalized anxiety disorder - worsening   3.  Alcohol use disorder, moderate (no alcohol in 4 weeks) - improving     Plan:  1.  Continue Lexapro 20 mg daily for anxiety and depression  2.  Start Buspirone 7.5 mg twice daily for 2 weeks and then increase to 15 mg twice daily for anxiety. Discussed side effects including lightheadedness, dizziness etc.  3.  I have encouraged her to continue to avoid alcohol use  4.  I let her know that her referral for therapy has been approved and she can reach out to [facility] and start psychotherapy  5.  Provided supportive psychotherapy (> 16 minutes). Provided encouragement in regards to not drinking alcohol for the last 4 weeks and get things that she has noticed in her day-to-day life. Discussed her lack of coping skills and how she uses alcohol as a coping mechanism to avoid thinking about her emotions with eventually she needs to work through those emotions related to her childhood and past relationships.    Return to clinic in 2 months or sooner if symptoms worsen    The proposed treatment plan was discussed with the patient who was provided the opportunity to ask questions and make suggestions regarding alternative treatment. Patient verbalized understanding and expressed agreement with the plan.     Humera Mackenzie M.D.  03/03/22    This note was created using voice recognition software (Dragon). The accuracy of the dictation is limited by the abilities of the software. I have reviewed the note prior to signing, however some errors in grammar and context are still possible. If you have any questions related to this note please do not hesitate to contact our office.

## 2022-04-29 ENCOUNTER — APPOINTMENT (OUTPATIENT)
Dept: BEHAVIORAL HEALTH | Facility: CLINIC | Age: 22
End: 2022-04-29
Payer: COMMERCIAL

## 2022-10-12 ENCOUNTER — OFFICE VISIT (OUTPATIENT)
Dept: URGENT CARE | Facility: PHYSICIAN GROUP | Age: 22
End: 2022-10-12
Payer: COMMERCIAL

## 2022-10-12 VITALS
OXYGEN SATURATION: 99 % | SYSTOLIC BLOOD PRESSURE: 108 MMHG | HEART RATE: 107 BPM | RESPIRATION RATE: 16 BRPM | BODY MASS INDEX: 32.96 KG/M2 | DIASTOLIC BLOOD PRESSURE: 70 MMHG | TEMPERATURE: 98.4 F | HEIGHT: 63 IN | WEIGHT: 186 LBS

## 2022-10-12 DIAGNOSIS — J02.0 STREPTOCOCCAL PHARYNGITIS: ICD-10-CM

## 2022-10-12 DIAGNOSIS — Z20.818 EXPOSURE TO STREP THROAT: ICD-10-CM

## 2022-10-12 LAB
INT CON NEG: NORMAL
INT CON POS: NORMAL
S PYO AG THROAT QL: POSITIVE

## 2022-10-12 PROCEDURE — 99213 OFFICE O/P EST LOW 20 MIN: CPT | Performed by: PHYSICIAN ASSISTANT

## 2022-10-12 PROCEDURE — 87880 STREP A ASSAY W/OPTIC: CPT | Performed by: PHYSICIAN ASSISTANT

## 2022-10-12 RX ORDER — AZITHROMYCIN 250 MG/1
TABLET, FILM COATED ORAL
Qty: 6 TABLET | Refills: 0 | Status: SHIPPED | OUTPATIENT
Start: 2022-10-12 | End: 2022-11-21

## 2022-10-12 NOTE — PROGRESS NOTES
"Subjective:   Gianna Cruz is a 21 y.o. female who presents for Sore Throat, Cough (Burning and dry), and Body Aches (yesterday)      HPI  The patient presents to the Urgent Care with complaints of a sore throat onset 5 days ago.  Patient's son tested positive for strep last week.  She has associated mild intermittent cough, chills, body aches yesterday.  She is handling oral secretions. Denies any chest pain, SOB, vomiting, diarrhea. Did not receive COVID vaccine. Did not test for COVID. Son positive for strep last week.         Medications:    acamprosate  escitalopram  naltrexone Tabs    Allergies: Pcn [penicillins]    Problem List: Gianna Cruz does not have any pertinent problems on file.    Surgical History:  No past surgical history on file.    Past Social Hx: Gianna Cruz  reports that she has never smoked. She has never used smokeless tobacco. She reports that she does not currently use alcohol. She reports that she does not use drugs.     Past Family Hx:  Gianna Cruz family history includes Alcohol abuse in her paternal uncle; Depression in her maternal grandmother, mother, and sister; No Known Problems in her father.     Problem list, medications, and allergies reviewed by myself today in Epic.     Objective:     /70 (BP Location: Right arm)   Pulse (!) 107   Temp 36.9 °C (98.4 °F) (Temporal)   Resp 16   Ht 1.6 m (5' 3\")   Wt 84.4 kg (186 lb)   SpO2 99%   BMI 32.95 kg/m²     Physical Exam  Vitals reviewed.   Constitutional:       General: She is not in acute distress.     Appearance: Normal appearance. She is not ill-appearing or toxic-appearing.   HENT:      Mouth/Throat:      Pharynx: Posterior oropharyngeal erythema present. No pharyngeal swelling, oropharyngeal exudate or uvula swelling.      Tonsils: No tonsillar exudate or tonsillar abscesses. 1+ on the right. 1+ on the left.   Eyes:      Conjunctiva/sclera: Conjunctivae normal.      Pupils: Pupils are equal, " round, and reactive to light.   Cardiovascular:      Rate and Rhythm: Normal rate and regular rhythm.      Heart sounds: Normal heart sounds.   Pulmonary:      Effort: Pulmonary effort is normal.      Breath sounds: Normal breath sounds.   Musculoskeletal:      Cervical back: Neck supple. No rigidity.   Lymphadenopathy:      Cervical: Cervical adenopathy present.      Right cervical: Superficial cervical adenopathy present.      Left cervical: Superficial cervical adenopathy present.   Skin:     General: Skin is warm and dry.   Neurological:      General: No focal deficit present.      Mental Status: She is alert and oriented to person, place, and time.   Psychiatric:         Mood and Affect: Mood normal.         Behavior: Behavior normal.     Strep A: Positive     Diagnosis and associated orders:     1. Streptococcal pharyngitis  - azithromycin (ZITHROMAX) 250 MG Tab; Take 2 tabs by mouth on day 1, then take 1 tab daily for the next 4 days.  Dispense: 6 Tablet; Refill: 0  - POCT Rapid Strep A    2. Exposure to strep throat     Comments/MDM:     POSITIVE Strep A.  Discussed treatment of azithromycin, warm salt water gargles, soft foods, cool liquids, ibuprofen and Tylenol for any pain or fevers.   Return to the urgent care if symptoms are not improving or any worsening symptoms or concerns.      I personally reviewed prior external notes and test results pertinent to today's visit. Pathogenesis of diagnosis discussed including typical length and natural progression. Supportive care, natural history, differential diagnoses, and indications for immediate follow-up discussed. Patient expresses understanding and agrees to plan. Patient denies any other questions or concerns.     Follow-up with the primary care physician for recheck, reevaluation, and consideration of further management.    Please note that this dictation was created using voice recognition software. I have made a reasonable attempt to correct obvious  errors, but I expect that there are errors of grammar and possibly content that I did not discover before finalizing the note.    This note was electronically signed by Brendan Rios PA-C

## 2022-11-21 ENCOUNTER — OFFICE VISIT (OUTPATIENT)
Dept: URGENT CARE | Facility: PHYSICIAN GROUP | Age: 22
End: 2022-11-21
Payer: COMMERCIAL

## 2022-11-21 ENCOUNTER — HOSPITAL ENCOUNTER (OUTPATIENT)
Facility: MEDICAL CENTER | Age: 22
End: 2022-11-21
Attending: STUDENT IN AN ORGANIZED HEALTH CARE EDUCATION/TRAINING PROGRAM
Payer: COMMERCIAL

## 2022-11-21 VITALS
RESPIRATION RATE: 16 BRPM | BODY MASS INDEX: 32.43 KG/M2 | WEIGHT: 183 LBS | HEART RATE: 78 BPM | TEMPERATURE: 97.1 F | OXYGEN SATURATION: 97 % | HEIGHT: 63 IN | SYSTOLIC BLOOD PRESSURE: 142 MMHG | DIASTOLIC BLOOD PRESSURE: 92 MMHG

## 2022-11-21 DIAGNOSIS — J06.9 UPPER RESPIRATORY TRACT INFECTION, UNSPECIFIED TYPE: ICD-10-CM

## 2022-11-21 DIAGNOSIS — J02.9 SORE THROAT: ICD-10-CM

## 2022-11-21 LAB
FLUAV RNA SPEC QL NAA+PROBE: POSITIVE
FLUBV RNA SPEC QL NAA+PROBE: NEGATIVE
INT CON NEG: NORMAL
INT CON POS: NORMAL
RSV RNA SPEC QL NAA+PROBE: NEGATIVE
S PYO AG THROAT QL: NEGATIVE
SARS-COV-2 RNA RESP QL NAA+PROBE: NOTDETECTED
SPECIMEN SOURCE: ABNORMAL

## 2022-11-21 PROCEDURE — 87880 STREP A ASSAY W/OPTIC: CPT | Performed by: STUDENT IN AN ORGANIZED HEALTH CARE EDUCATION/TRAINING PROGRAM

## 2022-11-21 PROCEDURE — 0241U HCHG SARS-COV-2 COVID-19 NFCT DS RESP RNA 4 TRGT MIC: CPT

## 2022-11-21 PROCEDURE — 99213 OFFICE O/P EST LOW 20 MIN: CPT | Mod: CS | Performed by: STUDENT IN AN ORGANIZED HEALTH CARE EDUCATION/TRAINING PROGRAM

## 2022-11-21 RX ORDER — DEXAMETHASONE SODIUM PHOSPHATE 4 MG/ML
4 INJECTION, SOLUTION INTRA-ARTICULAR; INTRALESIONAL; INTRAMUSCULAR; INTRAVENOUS; SOFT TISSUE ONCE
Status: COMPLETED | OUTPATIENT
Start: 2022-11-21 | End: 2022-11-21

## 2022-11-21 RX ORDER — BENZONATATE 100 MG/1
200 CAPSULE ORAL 3 TIMES DAILY PRN
Qty: 60 CAPSULE | Refills: 0 | Status: SHIPPED | OUTPATIENT
Start: 2022-11-21 | End: 2023-09-02

## 2022-11-21 RX ADMIN — DEXAMETHASONE SODIUM PHOSPHATE 4 MG: 4 INJECTION, SOLUTION INTRA-ARTICULAR; INTRALESIONAL; INTRAMUSCULAR; INTRAVENOUS; SOFT TISSUE at 12:24

## 2022-11-21 NOTE — PROGRESS NOTES
"Subjective:   Gianna Cruz is a 22 y.o. female who presents for Cough (X2days ) and Pharyngitis (/)      HPI:  Pleasant 22-year-old female presents to clinic for 2 days of a dry cough and sore throat.  She states that her daughter also has similar symptoms.  She reports that she is able to maintain adequate oral intake of fluids and solids.  She has been using OTC cold medication with mild relief from her symptoms.  She denies fever, chills, rash, ear pain, ear discharge, eye discharge, eye redness, chest pain, palpitations, shortness of breath, wheezing, nausea, vomiting, abdominal pain, dizziness, or headache.      Medications:    benzonatate Caps  dexamethasone  escitalopram    Allergies: Pcn [penicillins]    Problem List: Gianna Cruz does not have any pertinent problems on file.    Surgical History:  No past surgical history on file.    Past Social Hx: Gianna Cruz  reports that she has never smoked. She has never used smokeless tobacco. She reports that she does not currently use alcohol. She reports that she does not use drugs.     Past Family Hx:  Gianna Cruz family history includes Alcohol abuse in her paternal uncle; Depression in her maternal grandmother, mother, and sister; No Known Problems in her father.     Problem list, medications, and allergies reviewed by myself today in Epic.     Objective:     BP (!) 142/92   Pulse (!) 101   Temp 36.2 °C (97.1 °F) (Temporal)   Resp 16   Ht 1.6 m (5' 3\")   Wt 83 kg (183 lb)   SpO2 97%   BMI 32.42 kg/m²     Physical Exam  Vitals reviewed.   Constitutional:       General: She is not in acute distress.     Appearance: Normal appearance.   HENT:      Head: Normocephalic.      Right Ear: Tympanic membrane, ear canal and external ear normal.      Left Ear: Tympanic membrane, ear canal and external ear normal.      Nose: Congestion present.      Mouth/Throat:      Mouth: Mucous membranes are moist.      Pharynx: Uvula midline. Posterior " oropharyngeal erythema present. No oropharyngeal exudate.      Tonsils: No tonsillar exudate or tonsillar abscesses. 0 on the right. 0 on the left.   Eyes:      Conjunctiva/sclera: Conjunctivae normal.      Pupils: Pupils are equal, round, and reactive to light.   Cardiovascular:      Rate and Rhythm: Normal rate and regular rhythm.      Pulses: Normal pulses.      Heart sounds: Normal heart sounds. No murmur heard.  Pulmonary:      Effort: Pulmonary effort is normal. No respiratory distress.      Breath sounds: Normal breath sounds. No stridor. No wheezing, rhonchi or rales.   Musculoskeletal:      Cervical back: Normal range of motion.   Lymphadenopathy:      Cervical: No cervical adenopathy.   Skin:     General: Skin is warm and dry.      Capillary Refill: Capillary refill takes less than 2 seconds.      Findings: No erythema, lesion or rash.   Neurological:      General: No focal deficit present.      Mental Status: She is alert and oriented to person, place, and time.       Assessment/Plan:     Diagnosis and associated orders:     1. Sore throat  POCT Rapid Strep A    dexamethasone (DECADRON) injection 4 mg    dexamethasone (DECADRON) injection 4 mg      2. Upper respiratory tract infection, unspecified type  CoV-2, Flu A/B, And RSV by PCR (Cepheid)    benzonatate (TESSALON) 100 MG Cap    CANCELED: CoV-2 and Flu A/B by PCR (24 hour In-House): Collect [facility] swab in [facility]         Comments/MDM:     POCT strep a negative.  Patient's presentation this exam findings are consistent with upper respiratory tract infection.  We will give dexamethasone in clinic for her sore throat.  Advised her to continue with OTC cold medication at home.  We will also give her Tessalon to help control her dry cough.  Vitals all within normal limits and she is afebrile.  She is able to maintain adequate oral intake fluids and solids and showing no signs of dehydration.  Pulmonary exam shows no abnormal findings.  I do not suspect pneumonia  at this time.  Discussed that this is self-limited and should resolve over the next several days.  PCR testing for COVID/flu/RSV conducted for further evaluation which the patient is agreeable to.  Strict ED/return precautions were given.  Patient has good understanding of the signs and symptoms that warrant immediate reevaluation.         Differential diagnosis, natural history, supportive care, and indications for immediate follow-up discussed.    Advised the patient to follow-up with the primary care physician for recheck, reevaluation, and consideration of further management.    Please note that this dictation was created using voice recognition software. I have made a reasonable attempt to correct obvious errors, but I expect that there are errors of grammar and possibly content that I did not discover before finalizing the note.    Electronically signed by Nic Loving PA-C.

## 2023-09-02 ENCOUNTER — OFFICE VISIT (OUTPATIENT)
Dept: URGENT CARE | Facility: PHYSICIAN GROUP | Age: 23
End: 2023-09-02
Payer: COMMERCIAL

## 2023-09-02 VITALS
HEART RATE: 98 BPM | DIASTOLIC BLOOD PRESSURE: 70 MMHG | RESPIRATION RATE: 18 BRPM | TEMPERATURE: 97.8 F | WEIGHT: 177 LBS | HEIGHT: 63 IN | OXYGEN SATURATION: 98 % | SYSTOLIC BLOOD PRESSURE: 110 MMHG | BODY MASS INDEX: 31.36 KG/M2

## 2023-09-02 DIAGNOSIS — K08.89 PAIN, DENTAL: ICD-10-CM

## 2023-09-02 PROCEDURE — 3078F DIAST BP <80 MM HG: CPT | Performed by: FAMILY MEDICINE

## 2023-09-02 PROCEDURE — 1125F AMNT PAIN NOTED PAIN PRSNT: CPT | Performed by: FAMILY MEDICINE

## 2023-09-02 PROCEDURE — 99213 OFFICE O/P EST LOW 20 MIN: CPT | Performed by: FAMILY MEDICINE

## 2023-09-02 PROCEDURE — 3074F SYST BP LT 130 MM HG: CPT | Performed by: FAMILY MEDICINE

## 2023-09-02 RX ORDER — PROPRANOLOL HYDROCHLORIDE 10 MG/1
TABLET ORAL
COMMUNITY
Start: 2023-07-21

## 2023-09-02 RX ORDER — CLINDAMYCIN HYDROCHLORIDE 300 MG/1
300 CAPSULE ORAL 3 TIMES DAILY
Qty: 21 CAPSULE | Refills: 0 | Status: SHIPPED | OUTPATIENT
Start: 2023-09-02 | End: 2023-09-09

## 2023-09-02 RX ORDER — HYDROCODONE BITARTRATE AND ACETAMINOPHEN 5; 325 MG/1; MG/1
TABLET ORAL
Qty: 12 TABLET | Refills: 0 | Status: SHIPPED | OUTPATIENT
Start: 2023-09-02 | End: 2023-09-05

## 2023-09-02 ASSESSMENT — PAIN SCALES - GENERAL: PAINLEVEL: 10=SEVERE PAIN

## 2023-09-02 NOTE — PROGRESS NOTES
Chief Complaint:    Chief Complaint   Patient presents with    Dental Pain     Tooth pulled a few days ago- pain is worsening  Wasn't given any meds from dentist       History of Present Illness:    Left upper molar extracted on 8/31/23, didn't have any pain that day (possibly because meds were still on board), but next day started having pain in extraction site which is persisting and pain radiating out from this area. Dentist did not give her any meds to use after the extraction.      Past Medical History:    Past Medical History:   Diagnosis Date    Depression     JOSEP (generalized anxiety disorder) 9/14/2020     Past Surgical History:    History reviewed. No pertinent surgical history.    Social History:    Social History     Socioeconomic History    Marital status: Single     Spouse name: Not on file    Number of children: Not on file    Years of education: Not on file    Highest education level: Not on file   Occupational History    Not on file   Tobacco Use    Smoking status: Never    Smokeless tobacco: Never    Tobacco comments:     vapes   Vaping Use    Vaping Use: Every day    Substances: Nicotine   Substance and Sexual Activity    Alcohol use: Not Currently     Comment: no alcohol in 4 weeks    Drug use: No    Sexual activity: Yes     Partners: Male     Birth control/protection: Injection   Other Topics Concern    Behavioral problems Not Asked    Interpersonal relationships Not Asked    Sad or not enjoying activities Not Asked    Suicidal thoughts Not Asked    Poor school performance Not Asked    Reading difficulties Not Asked    Speech difficulties Not Asked    Writing difficulties Not Asked    Inadequate sleep Not Asked    Excessive TV viewing Not Asked    Excessive video game use Not Asked    Inadequate exercise Not Asked    Sports related Not Asked    Poor diet Not Asked    Family concerns for drug/alcohol abuse Not Asked    Poor oral hygiene Not Asked    Bike safety Not Asked    Family concerns  "vehicle safety Not Asked   Social History Narrative    [place], lives with alexandre     Social Determinants of Health     Financial Resource Strain: Not on file   Food Insecurity: Not on file   Transportation Needs: Not on file   Physical Activity: Not on file   Stress: Not on file   Social Connections: Not on file   Intimate Partner Violence: Not on file   Housing Stability: Not on file     Family History:    Family History   Problem Relation Age of Onset    Depression Mother     No Known Problems Father     Depression Sister     Alcohol abuse Paternal Uncle     Depression Maternal Grandmother      Medications:    Current Outpatient Medications on File Prior to Visit   Medication Sig Dispense Refill    propranolol (INDERAL) 10 MG Tab TAKE 2 TABLETS BY MOUTH TWICE DAILY AS NEEDED      escitalopram (LEXAPRO) 20 MG tablet Take 1 Tablet by mouth every day. 30 Tablet 0     No current facility-administered medications on file prior to visit.     Allergies:    Allergies   Allergen Reactions    Pcn [Penicillins]        Vitals:    Vitals:    09/02/23 1424   BP: 110/70   Pulse: 98   Resp: 18   Temp: 36.6 °C (97.8 °F)   TempSrc: Temporal   SpO2: 98%   Weight: 80.3 kg (177 lb)   Height: 1.6 m (5' 3\")       Physical Exam:    Constitutional: Vital signs reviewed. Appears well-developed and well-nourished. No acute distress.   Eyes: Sclera white, conjunctivae clear.   ENT: Left most posterior upper molar extraction site is tender. External ears normal. Hearing normal. Lips are normal. Oral mucosa pink and moist.   Neck: Neck supple.   Pulmonary/Chest: Respirations non-labored.   Musculoskeletal: Normal gait. No muscular atrophy or weakness.  Neurological: Alert and oriented to person, place, and time. Muscle tone normal. Coordination normal.   Skin: No rashes or lesions. Warm, dry, normal turgor.  Psychiatric: Normal mood and affect. Behavior is normal. Judgment and thought content normal.       Assessment / Plan & Medical Decision " Makin. Pain, dental  - clindamycin (CLEOCIN) 300 MG Cap; Take 1 Capsule by mouth 3 times a day for 7 days.  Dispense: 21 Capsule; Refill: 0  - HYDROcodone-acetaminophen (NORCO) 5-325 MG Tab per tablet; 1 TAB BY MOUTH EVERY 6 HOURS ONLY IF NEEDED FOR PAIN FOR UP TO 3 DAYS. MAY CAUSE DROWSINESS.  Dispense: 12 Tablet; Refill: 0       Discussed with her DDX, management options, and risks, benefits, and alternatives to treatment plan agreed upon.    Left upper molar extracted on 23, didn't have any pain that day (possibly because meds were still on board), but next day started having pain in extraction site which is persisting and pain radiating out from this area. Dentist did not give her any meds to use after the extraction.    Left most posterior upper molar extraction site is tender.    Agreeable to medications prescribed.  report checked - no Rx x 2 years.    In my professional judgment, after considering each of the factors set forth in , the CS is medically necessary and appropriate.    Advised to see her Dentist for follow-up if not improving with meds prescribed.    She will return to urgent care if needed.

## 2023-09-15 ENCOUNTER — APPOINTMENT (OUTPATIENT)
Dept: URGENT CARE | Facility: PHYSICIAN GROUP | Age: 23
End: 2023-09-15
Payer: COMMERCIAL

## 2023-10-09 ENCOUNTER — OFFICE VISIT (OUTPATIENT)
Dept: URGENT CARE | Facility: PHYSICIAN GROUP | Age: 23
End: 2023-10-09
Payer: COMMERCIAL

## 2023-10-09 VITALS
BODY MASS INDEX: 30.48 KG/M2 | RESPIRATION RATE: 18 BRPM | WEIGHT: 172 LBS | HEART RATE: 85 BPM | TEMPERATURE: 98 F | HEIGHT: 63 IN | OXYGEN SATURATION: 97 % | SYSTOLIC BLOOD PRESSURE: 100 MMHG | DIASTOLIC BLOOD PRESSURE: 70 MMHG

## 2023-10-09 DIAGNOSIS — Z3A.10 10 WEEKS GESTATION OF PREGNANCY: ICD-10-CM

## 2023-10-09 DIAGNOSIS — O21.9 NAUSEA AND VOMITING DURING PREGNANCY: ICD-10-CM

## 2023-10-09 DIAGNOSIS — J02.9 ACUTE PHARYNGITIS, UNSPECIFIED ETIOLOGY: ICD-10-CM

## 2023-10-09 DIAGNOSIS — R09.81 NASAL CONGESTION: ICD-10-CM

## 2023-10-09 DIAGNOSIS — R30.0 DYSURIA: ICD-10-CM

## 2023-10-09 LAB
APPEARANCE UR: CLEAR
BILIRUB UR STRIP-MCNC: NORMAL MG/DL
COLOR UR AUTO: NORMAL
FLUAV RNA SPEC QL NAA+PROBE: NEGATIVE
FLUBV RNA SPEC QL NAA+PROBE: NEGATIVE
GLUCOSE UR STRIP.AUTO-MCNC: NORMAL MG/DL
KETONES UR STRIP.AUTO-MCNC: NORMAL MG/DL
LEUKOCYTE ESTERASE UR QL STRIP.AUTO: NORMAL
NITRITE UR QL STRIP.AUTO: NORMAL
PH UR STRIP.AUTO: 6 [PH] (ref 5–8)
PROT UR QL STRIP: NORMAL MG/DL
RBC UR QL AUTO: NORMAL
RSV RNA SPEC QL NAA+PROBE: NEGATIVE
S PYO DNA SPEC NAA+PROBE: NOT DETECTED
SARS-COV-2 RNA RESP QL NAA+PROBE: NEGATIVE
SP GR UR STRIP.AUTO: >=1.03
UROBILINOGEN UR STRIP-MCNC: 0.2 MG/DL

## 2023-10-09 PROCEDURE — 87651 STREP A DNA AMP PROBE: CPT | Performed by: NURSE PRACTITIONER

## 2023-10-09 PROCEDURE — 3074F SYST BP LT 130 MM HG: CPT | Performed by: NURSE PRACTITIONER

## 2023-10-09 PROCEDURE — 3078F DIAST BP <80 MM HG: CPT | Performed by: NURSE PRACTITIONER

## 2023-10-09 PROCEDURE — 81002 URINALYSIS NONAUTO W/O SCOPE: CPT | Performed by: NURSE PRACTITIONER

## 2023-10-09 PROCEDURE — 99214 OFFICE O/P EST MOD 30 MIN: CPT | Mod: 25 | Performed by: NURSE PRACTITIONER

## 2023-10-09 PROCEDURE — 0241U POCT CEPHEID COV-2, FLU A/B, RSV - PCR: CPT | Performed by: NURSE PRACTITIONER

## 2023-10-09 ASSESSMENT — ENCOUNTER SYMPTOMS
DIAPHORESIS: 1
FLANK PAIN: 0
NAUSEA: 1
COUGH: 1
SPUTUM PRODUCTION: 1
VOMITING: 1
HEADACHES: 0
FEVER: 0
PHOTOPHOBIA: 1
SORE THROAT: 1
DIARRHEA: 0
ABDOMINAL PAIN: 1
SHORTNESS OF BREATH: 1
WHEEZING: 1
CHILLS: 1
SINUS PAIN: 0

## 2023-10-09 NOTE — PROGRESS NOTES
"Subjective:   Gianna Cruz is a 22 y.o. female who presents for UTI (Pain in lower pelvis area, urgency without much coming out- dribbling/Is pregnant, 10 weeks) and Congestion (In throat- family has upper respiratory)    Dysuria: patient is a 22-year-old female presenting to clinic today with 3-week history of lower pubic pressure, pain with urination, and dribbling.  Patient is currently 10 weeks pregnant.  She states that she has been seen twice in the emergency department once the first week in September and was positive for a UTI and treated, 10 days later she represented to the emergency department with the same symptoms however was negative for UTI.  She did have a fetal ultrasound at that time and everything was normal.  Patient also has nausea however she did have severe nausea with her first pregnancy.  She does have a current appointment with her GYN on 10/12/2023.  Patient is currently taking prenatal vitamin.  A1    URI: Patient states that she has had 3-day history of mild nasal congestion, sore throat, chills, \"cold sweats\" cough with some sputum production, intermittent shortness of breath,and wheezing.  She has had normal activity level and states that her symptoms are mild.  She does have stepchildren in her home with similar symptoms.  She has not taken anything over-the-counter for treatment.      Review of Systems   Constitutional:  Positive for chills and diaphoresis. Negative for fever.   HENT:  Positive for congestion and sore throat. Negative for ear discharge, ear pain and sinus pain.    Eyes:  Positive for photophobia.   Respiratory:  Positive for cough, sputum production, shortness of breath and wheezing.    Gastrointestinal:  Positive for abdominal pain, nausea and vomiting. Negative for diarrhea.   Genitourinary:  Positive for dysuria, frequency and urgency. Negative for flank pain and hematuria.   Neurological:  Negative for headaches.       Medications, Allergies, and " "current problem list reviewed today in Epic.     Objective:     /70   Pulse 85   Temp 36.7 °C (98 °F) (Temporal)   Resp 18   Ht 1.6 m (5' 3\")   Wt 78 kg (172 lb)   SpO2 97%     Physical Exam  Vitals reviewed.   Constitutional:       General: She is not in acute distress.     Appearance: Normal appearance. She is not ill-appearing or toxic-appearing.   HENT:      Head: Normocephalic.      Right Ear: Tympanic membrane, ear canal and external ear normal.      Left Ear: Tympanic membrane, ear canal and external ear normal.      Nose: Rhinorrhea present. No mucosal edema or congestion.      Right Sinus: No maxillary sinus tenderness or frontal sinus tenderness.      Left Sinus: No maxillary sinus tenderness or frontal sinus tenderness.      Mouth/Throat:      Lips: Pink.      Mouth: Mucous membranes are moist.      Pharynx: Oropharynx is clear. Uvula midline. No oropharyngeal exudate or posterior oropharyngeal erythema.   Eyes:      Extraocular Movements: Extraocular movements intact.      Conjunctiva/sclera: Conjunctivae normal.      Pupils: Pupils are equal, round, and reactive to light.   Cardiovascular:      Rate and Rhythm: Normal rate and regular rhythm.   Pulmonary:      Effort: Pulmonary effort is normal.      Breath sounds: Normal breath sounds. No wheezing, rhonchi or rales.   Abdominal:      General: Abdomen is flat. There is no distension.      Palpations: Abdomen is soft.      Tenderness: There is abdominal tenderness in the suprapubic area. There is no right CVA tenderness, left CVA tenderness, guarding or rebound.   Musculoskeletal:         General: Normal range of motion.      Cervical back: Normal range of motion and neck supple.   Lymphadenopathy:      Cervical: No cervical adenopathy.   Skin:     General: Skin is warm and dry.   Neurological:      Mental Status: She is alert and oriented to person, place, and time.   Psychiatric:         Mood and Affect: Mood normal.         Behavior: " Behavior normal.         Thought Content: Thought content normal.         Judgment: Judgment normal.       Results for orders placed or performed in visit on 10/09/23   POCT CEPHEID COV-2, FLU A/B, RSV - PCR   Result Value Ref Range    SARS-CoV-2 by PCR Negative Negative, Invalid    Influenza virus A RNA Negative Negative, Invalid    Influenza virus B, PCR Negative Negative, Invalid    RSV, PCR Negative Negative, Invalid   POCT Urinalysis   Result Value Ref Range    POC Color tammi Negative    POC Appearance clear Negative    POC Glucose neg Negative mg/dL    POC Bilirubin neg Negative mg/dL    POC Ketones neg Negative mg/dL    POC Specific Gravity >=1.030 <1.005 - >1.030    POC Blood trace-intact Negative    POC Urine PH 6.0 5.0 - 8.0    POC Protein neg Negative mg/dL    POC Urobiligen 0.2 Negative (0.2) mg/dL    POC Nitrites neg Negative    POC Leukocyte Esterase neg Negative   POCT CEPHEID GROUP A STREP - PCR   Result Value Ref Range    POC Group A Strep, PCR Not Detected Not Detected, Invalid       Assessment/Plan:     Diagnosis and associated orders:     1. Dysuria  POCT Urinalysis      2. Nasal congestion  POCT CEPHEID COV-2, FLU A/B, RSV - PCR      3. Acute pharyngitis, unspecified etiology  POCT CEPHEID COV-2, FLU A/B, RSV - PCR    POCT CEPHEID GROUP A STREP - PCR      4. Nausea and vomiting during pregnancy        5. 10 weeks gestation of pregnancy           Comments/MDM:     Patient is a very pleasant, 22-year-old female presenting to clinic in no acute distress, non-ill-appearing, and very talkative and active during her exam.  She has been seen multiple times in the emergency department for similar symptoms of dysuria.  She was treated for UTI during first office visit however came back negative on the second ER visit.  Patient is negative for UTI today.  On physical exam, patient does have mild suprapubic tenderness and states it is more a sensation of pressure.  She denies actual pain with palpation.   Abdomen is nondistended, no rebound or guarding noted.  Low suspicion at this time for ectopic pregnancy.  Posterior oropharynx is unremarkable.  She does have mild clear rhinorrhea in bilateral nasal passages.  Low suspicion at this time for bacterial infection.  POCT urinalysis was unremarkable.  Low suspicion at this time for acute cystitis.  POCT COVID, influenza, RSV, and strep all negative.  Discussed with patient at length that nasal congestion, increased nasal discharge, urinary urgency and frequency, nausea, and vomiting can be common during first trimester of pregnancy.    Recommended patient increase fluids, rest, and may take Tylenol for any pain.  Patient was instructed to not take any ibuprofen.    Education was provided to help alleviate nausea symptoms in first trimester along with handouts.  Highly recommended patient keep upcoming appointment with OB/GYN on 10/12/2023.  ER precautions were discussed.         Differential diagnosis, natural history, supportive care, and indications for immediate follow-up discussed.    Advised the patient to follow-up with the primary care physician for recheck, reevaluation, and consideration of further management.    I personally reviewed prior external notes and test results pertinent to today's visit as well as additional imaging and testing completed in clinic today.      I have spent 35 minutes on the care of Gianna Cruz.  This includes preparing for the urgent care visit. This time includes review of previous visits/ documents, obtaining HPI, examination and evaluation of patient, ordering and interpretation of labs, imaging, tests, medical management, counseling, education and documentation.      Please note that this dictation was created using voice recognition software. I have made a reasonable attempt to correct obvious errors, but I expect that there are errors of grammar and possibly content that I did not discover before finalizing the  note.

## 2024-05-04 ENCOUNTER — OFFICE VISIT (OUTPATIENT)
Dept: URGENT CARE | Facility: PHYSICIAN GROUP | Age: 24
End: 2024-05-04
Payer: COMMERCIAL

## 2024-05-04 VITALS
DIASTOLIC BLOOD PRESSURE: 72 MMHG | SYSTOLIC BLOOD PRESSURE: 112 MMHG | RESPIRATION RATE: 16 BRPM | WEIGHT: 194 LBS | OXYGEN SATURATION: 97 % | HEART RATE: 91 BPM | TEMPERATURE: 97.9 F | BODY MASS INDEX: 34.38 KG/M2 | HEIGHT: 63 IN

## 2024-05-04 DIAGNOSIS — J01.90 ACUTE BACTERIAL SINUSITIS: ICD-10-CM

## 2024-05-04 DIAGNOSIS — B96.89 ACUTE BACTERIAL SINUSITIS: ICD-10-CM

## 2024-05-04 PROCEDURE — 3074F SYST BP LT 130 MM HG: CPT | Performed by: FAMILY MEDICINE

## 2024-05-04 PROCEDURE — 99213 OFFICE O/P EST LOW 20 MIN: CPT | Performed by: FAMILY MEDICINE

## 2024-05-04 PROCEDURE — 3078F DIAST BP <80 MM HG: CPT | Performed by: FAMILY MEDICINE

## 2024-05-04 RX ORDER — AZITHROMYCIN 250 MG/1
TABLET, FILM COATED ORAL
Qty: 6 TABLET | Refills: 0 | Status: SHIPPED | OUTPATIENT
Start: 2024-05-04 | End: 2024-05-09

## 2024-05-04 RX ORDER — LEVOTHYROXINE SODIUM 0.07 MG/1
75 TABLET ORAL
COMMUNITY
Start: 2024-04-23

## 2024-05-04 NOTE — PROGRESS NOTES
Chief Complaint:    Chief Complaint   Patient presents with    Cough     Few months  Ongoing cough worsening the last 1-2 weeks, sinus pressure, congestion,        History of Present Illness:    Having fluctuating symptoms for many weeks, but past 1-2 weeks, she is worsening. Having nasal congestion with purulent mucus from nose, discomfort in sinus regions, and cough. She is currently pregnant and due for delivery in about 1 week.      Past Medical History:    Past Medical History:   Diagnosis Date    Depression     JOSEP (generalized anxiety disorder) 9/14/2020     Past Surgical History:    History reviewed. No pertinent surgical history.    Social History:    Social History     Socioeconomic History    Marital status: Single     Spouse name: Not on file    Number of children: Not on file    Years of education: Not on file    Highest education level: Not on file   Occupational History    Not on file   Tobacco Use    Smoking status: Never    Smokeless tobacco: Never    Tobacco comments:     vapes   Vaping Use    Vaping Use: Former    Substances: Nicotine   Substance and Sexual Activity    Alcohol use: Not Currently     Comment: no alcohol in 4 weeks    Drug use: No    Sexual activity: Yes     Partners: Male     Birth control/protection: Injection   Other Topics Concern    Behavioral problems Not Asked    Interpersonal relationships Not Asked    Sad or not enjoying activities Not Asked    Suicidal thoughts Not Asked    Poor school performance Not Asked    Reading difficulties Not Asked    Speech difficulties Not Asked    Writing difficulties Not Asked    Inadequate sleep Not Asked    Excessive TV viewing Not Asked    Excessive video game use Not Asked    Inadequate exercise Not Asked    Sports related Not Asked    Poor diet Not Asked    Family concerns for drug/alcohol abuse Not Asked    Poor oral hygiene Not Asked    Bike safety Not Asked    Family concerns vehicle safety Not Asked   Social History Narrative    Jimenez  "lives with bf     Social Determinants of Health     Financial Resource Strain: Not on file   Food Insecurity: Not on file   Transportation Needs: Not on file   Physical Activity: Not on file   Stress: Not on file   Social Connections: Not on file   Intimate Partner Violence: Not on file   Housing Stability: Not on file     Family History:    Family History   Problem Relation Age of Onset    Depression Mother     No Known Problems Father     Depression Sister     Alcohol abuse Paternal Uncle     Depression Maternal Grandmother      Medications:    Current Outpatient Medications on File Prior to Visit   Medication Sig Dispense Refill    levothyroxine (SYNTHROID) 75 MCG Tab Take 75 mcg by mouth every day.       No current facility-administered medications on file prior to visit.     Allergies:    Allergies   Allergen Reactions    Pcn [Penicillins]        Vitals:    Vitals:    05/04/24 1236   BP: 112/72   Pulse: 91   Resp: 16   Temp: 36.6 °C (97.9 °F)   TempSrc: Temporal   SpO2: 97%   Weight: 88 kg (194 lb)   Height: 1.6 m (5' 3\")       Physical Exam:    Constitutional: Vital signs reviewed. Appears well-developed and well-nourished. No acute distress.   Eyes: Sclera white, conjunctivae clear.   ENT: TTP all sinus regions bilaterally. External ears normal. External auditory canals normal without discharge. TMs translucent and non-bulging. Hearing normal. Lips/teeth are normal. Oral mucosa pink and moist. Posterior pharynx: WNL.  Neck: Neck supple.   Cardiovascular: Regular rate and rhythm. No murmur.  Pulmonary/Chest: Respirations non-labored. Clear to auscultation bilaterally.  Musculoskeletal: Normal gait. No muscular atrophy or weakness.  Neurological: Alert and oriented to person, place, and time. Muscle tone normal. Coordination normal.   Skin: No rashes or lesions. Warm, dry, normal turgor.  Psychiatric: Normal mood and affect. Behavior is normal. Judgment and thought content normal.       Assessment / Plan & " Medical Decision Makin. Acute bacterial sinusitis  - azithromycin (ZITHROMAX) 250 MG Tab; 2 TABS BY MOUTH ON DAY 1, 1 TAB ON DAYS 2-5.  Dispense: 6 Tablet; Refill: 0       Discussed with her DDX, management options, and risks, benefits, and alternatives to treatment plan agreed upon.    Having fluctuating symptoms for many weeks, but past 1-2 weeks, she is worsening. Having nasal congestion with purulent mucus from nose, discomfort in sinus regions, and cough. She is currently pregnant and due for delivery in about 1 week.    TTP all sinus regions bilaterally.     Agreeable to medication prescribed.    Discussed expected course of duration, time for improvement, and to seek follow-up in Emergency Room, urgent care, or with PCP if getting worse at any time or not improving within expected time frame.

## 2024-12-06 ENCOUNTER — OFFICE VISIT (OUTPATIENT)
Dept: URGENT CARE | Facility: PHYSICIAN GROUP | Age: 24
End: 2024-12-06
Payer: MEDICAID

## 2024-12-06 VITALS
DIASTOLIC BLOOD PRESSURE: 72 MMHG | TEMPERATURE: 97.5 F | SYSTOLIC BLOOD PRESSURE: 118 MMHG | HEART RATE: 77 BPM | RESPIRATION RATE: 16 BRPM | OXYGEN SATURATION: 96 %

## 2024-12-06 DIAGNOSIS — J06.9 URI WITH COUGH AND CONGESTION: ICD-10-CM

## 2024-12-06 LAB
FLUAV RNA SPEC QL NAA+PROBE: NEGATIVE
FLUBV RNA SPEC QL NAA+PROBE: NEGATIVE
RSV RNA SPEC QL NAA+PROBE: NEGATIVE
S PYO DNA SPEC NAA+PROBE: NOT DETECTED
SARS-COV-2 RNA RESP QL NAA+PROBE: NEGATIVE

## 2024-12-06 PROCEDURE — 87651 STREP A DNA AMP PROBE: CPT | Performed by: NURSE PRACTITIONER

## 2024-12-06 PROCEDURE — 3074F SYST BP LT 130 MM HG: CPT | Performed by: NURSE PRACTITIONER

## 2024-12-06 PROCEDURE — 99213 OFFICE O/P EST LOW 20 MIN: CPT | Performed by: NURSE PRACTITIONER

## 2024-12-06 PROCEDURE — 3078F DIAST BP <80 MM HG: CPT | Performed by: NURSE PRACTITIONER

## 2024-12-06 PROCEDURE — 87637 SARSCOV2&INF A&B&RSV AMP PRB: CPT | Mod: QW | Performed by: NURSE PRACTITIONER

## 2024-12-06 RX ORDER — ESCITALOPRAM OXALATE 20 MG/1
20 TABLET ORAL
COMMUNITY
Start: 2024-11-21

## 2024-12-06 RX ORDER — DEXTROMETHORPHAN HYDROBROMIDE AND PROMETHAZINE HYDROCHLORIDE 15; 6.25 MG/5ML; MG/5ML
5 SYRUP ORAL EVERY 4 HOURS PRN
Qty: 120 ML | Refills: 0 | Status: SHIPPED | OUTPATIENT
Start: 2024-12-06 | End: 2024-12-13

## 2024-12-06 ASSESSMENT — ENCOUNTER SYMPTOMS: COUGH: 1

## 2024-12-07 NOTE — PROGRESS NOTES
Miguel please speak with subjective:     Gianna Sandrita Cruz is a 24 y.o. female who presents for Cough (Coughing, rough. Sore throat. Clogged throat. Runny nose. Hard time breathing. Started today. )      Cough      Pt presents for evaluation of a new problem.  Adri is a pleasant 24-year-old female presents to urgent care today with complaints of a sore throat, congestion, rhinorrhea and productive cough that started today.  She notes that she felt very ill this morning however, her symptoms have improved throughout the day.  Negative for shortness of breath, nausea/vomiting or diarrhea.    Review of Systems   Respiratory:  Positive for cough.        PMH:   Past Medical History:   Diagnosis Date    Depression     JOSEP (generalized anxiety disorder) 9/14/2020     ALLERGIES:   Allergies   Allergen Reactions    Pcn [Penicillins]      SURGHX: History reviewed. No pertinent surgical history.  SOCHX:   Social History     Socioeconomic History    Marital status: Single   Tobacco Use    Smoking status: Never    Smokeless tobacco: Never    Tobacco comments:     vapes   Vaping Use    Vaping status: Former    Substances: Nicotine   Substance and Sexual Activity    Alcohol use: Not Currently     Comment: no alcohol in 4 weeks    Drug use: No    Sexual activity: Yes     Partners: Male     Birth control/protection: Injection   Social History Narrative    Sah, lives with bf     FH:   Family History   Problem Relation Age of Onset    Depression Mother     No Known Problems Father     Depression Sister     Alcohol abuse Paternal Uncle     Depression Maternal Grandmother          Objective:   /72   Pulse 77   Temp 36.4 °C (97.5 °F) (Temporal)   Resp 16   SpO2 96%     Physical Exam  Vitals and nursing note reviewed.   Constitutional:       General: She is not in acute distress.     Appearance: Normal appearance. She is normal weight. She is not ill-appearing or toxic-appearing.   HENT:      Head: Normocephalic.      Right  Ear: External ear normal.      Left Ear: External ear normal.      Nose: Congestion present. No rhinorrhea.      Mouth/Throat:      Pharynx: No oropharyngeal exudate or posterior oropharyngeal erythema.   Eyes:      General:         Right eye: No discharge.         Left eye: No discharge.      Pupils: Pupils are equal, round, and reactive to light.   Cardiovascular:      Rate and Rhythm: Normal rate and regular rhythm.      Pulses: Normal pulses.      Heart sounds: Normal heart sounds.   Pulmonary:      Effort: Pulmonary effort is normal. No respiratory distress.      Breath sounds: No stridor. No wheezing or rhonchi.   Chest:      Chest wall: No tenderness.   Abdominal:      General: Abdomen is flat.   Musculoskeletal:         General: Normal range of motion.      Cervical back: Normal range of motion and neck supple. No tenderness.   Lymphadenopathy:      Cervical: No cervical adenopathy.   Skin:     General: Skin is dry.   Neurological:      General: No focal deficit present.      Mental Status: She is alert and oriented to person, place, and time. Mental status is at baseline.   Psychiatric:         Mood and Affect: Mood normal.         Behavior: Behavior normal.         Thought Content: Thought content normal.         Judgment: Judgment normal.       Results for orders placed or performed in visit on 12/06/24   POCT CoV-2, Flu A/B, RSV by PCR    Collection Time: 12/06/24  5:28 PM   Result Value Ref Range    SARS-CoV-2 by PCR Negative Negative, Invalid    Influenza virus A RNA Negative Negative, Invalid    Influenza virus B, PCR Negative Negative, Invalid    RSV, PCR Negative Negative, Invalid   POCT CEPHEID GROUP A STREP - PCR    Collection Time: 12/06/24  5:28 PM   Result Value Ref Range    POC Group A Strep, PCR Not Detected Not Detected, Invalid       Assessment/Plan:   Assessment      1. URI with cough and congestion  promethazine-dextromethorphan (PROMETHAZINE-DM) 6.25-15 MG/5ML syrup    POCT CoV-2, Flu  A/B, RSV by PCR    POCT CEPHEID GROUP A STREP - PCR        We discussed supportive measures including humidifier, warm salt water gargles, over-the-counter Cepacol throat lozenges, rest  and increased fluids. Pt was encouraged to seek treatment back in the ER or urgent care for worsening symptoms,  fever greater than 100.5, wheezes or shortness of breath.